# Patient Record
Sex: FEMALE | Race: WHITE | Employment: FULL TIME | URBAN - METROPOLITAN AREA
[De-identification: names, ages, dates, MRNs, and addresses within clinical notes are randomized per-mention and may not be internally consistent; named-entity substitution may affect disease eponyms.]

---

## 2020-11-09 ENCOUNTER — OFFICE VISIT (OUTPATIENT)
Dept: INTERNAL MEDICINE CLINIC | Age: 25
End: 2020-11-09
Payer: OTHER GOVERNMENT

## 2020-11-09 VITALS
SYSTOLIC BLOOD PRESSURE: 109 MMHG | HEIGHT: 63 IN | DIASTOLIC BLOOD PRESSURE: 64 MMHG | RESPIRATION RATE: 18 BRPM | HEART RATE: 81 BPM | TEMPERATURE: 98.3 F | BODY MASS INDEX: 28.42 KG/M2 | WEIGHT: 160.4 LBS | OXYGEN SATURATION: 96 %

## 2020-11-09 DIAGNOSIS — R51.9 CHRONIC INTRACTABLE HEADACHE, UNSPECIFIED HEADACHE TYPE: Primary | ICD-10-CM

## 2020-11-09 DIAGNOSIS — G89.29 CHRONIC INTRACTABLE HEADACHE, UNSPECIFIED HEADACHE TYPE: Primary | ICD-10-CM

## 2020-11-09 DIAGNOSIS — Z23 NEEDS FLU SHOT: ICD-10-CM

## 2020-11-09 DIAGNOSIS — F32.A ANXIETY AND DEPRESSION: ICD-10-CM

## 2020-11-09 DIAGNOSIS — F41.9 ANXIETY AND DEPRESSION: ICD-10-CM

## 2020-11-09 DIAGNOSIS — R11.10 RECURRENT VOMITING: ICD-10-CM

## 2020-11-09 DIAGNOSIS — G47.00 INSOMNIA, UNSPECIFIED TYPE: ICD-10-CM

## 2020-11-09 PROCEDURE — 99204 OFFICE O/P NEW MOD 45 MIN: CPT | Performed by: INTERNAL MEDICINE

## 2020-11-09 PROCEDURE — 90686 IIV4 VACC NO PRSV 0.5 ML IM: CPT | Performed by: INTERNAL MEDICINE

## 2020-11-09 PROCEDURE — 90471 IMMUNIZATION ADMIN: CPT | Performed by: INTERNAL MEDICINE

## 2020-11-09 RX ORDER — VENLAFAXINE HYDROCHLORIDE 37.5 MG/1
37.5 CAPSULE, EXTENDED RELEASE ORAL DAILY
Qty: 30 CAP | Refills: 2 | Status: SHIPPED | OUTPATIENT
Start: 2020-11-09 | End: 2020-12-08 | Stop reason: SDUPTHER

## 2020-11-09 NOTE — PATIENT INSTRUCTIONS
1.    Please follow the following instructions to process/authorize your referral, if needed: 
 
Referrals processing Please verify with your insurance IF you need referral authorization submitted. For insurance plans which require this, please follow the following steps. FAILURE TO DO SO MAY RESULT IN INABILITY TO SEE THE SPECIALIST YOU HAVE BEEN REFERRED TO (once you are scheduled to see them). 1. Call and schedule appointment with specialist 
2. Call our clinic and leave message with provider name, and date of appointment 3. We will then submit the referral to your insurance. This process takes 2-5 business days. If you have questions about scheduling or authorizing referral, you can review with our referral coordinator. You can review with her today if available/if you have time, or you can call to review once you have made your referral/appointment. If you are not sure if you need referral authorizations, please review with the referral coordinator(s), either prior to or after you have made the appointment, as reviewed. 2. In order to clarify which mental health provider you can see, that takes your insurance you can: 
 
--Contact your insurance (you may have a  assigned) to find covered providers. 3.  If you can download your outside records, you can either: 
--bring to us at follow-up to review and scan into your chart 
--attach to a [employer] message and send to us 
--fax to [phone].

## 2020-11-09 NOTE — PROGRESS NOTES
RM#17  Would like the Flu vaccine    Chief Complaint   Patient presents with    New Patient     headache nothing is helping    Insomnia     Ambien was working   [facility] Anxiety     stomach upset, tunnel vison fast HR       1. Have you been to the ER, urgent care clinic since your last visit? Hospitalized since your last visit? [facility] clinic for Covid test results where negative. 2. Have you seen or consulted any other health care providers outside of the [address] [place] [address] since your last visit? Include any pap smears or colon screening. No    Health Maintenance Due   Topic Date Due    HPV Age 9Y-34Y (1 - 2-dose series) 07/31/2006    DTaP/Tdap/Td series (1 - Tdap) 07/31/2016    PAP AKA CERVICAL CYTOLOGY  07/31/2016    Flu Vaccine (1) 09/01/2020       No flowsheet data found. After obtaining consent, and per orders of Dr. Kasia Stewart, injection of Flu vaccine given by Karoline Phalen. Patient instructed to remain in clinic for 20 minutes afterwards, and to report any adverse reaction to me immediately. AVS, education, follow up and recommendations provided and addressed with patient.   services used to advise patient no

## 2020-11-09 NOTE — PROGRESS NOTES
History of Present Illness:   Delonte Lester is a 22 y.o. female here for evaluation:    Chief Complaint   Patient presents with    New Patient     headache nothing is helping    Insomnia     Ambien was working   [facility] Anxiety     stomach upset, tunnel vison fast HR       Notes (nursing/rooming note copied below in italics): Would like the Flu vaccine       Here as new pt to establish care. No records in [address] [place] [address] or [facility]) to review. Prescription Monitoring Program ([place]) database query with fills:  No fills in last 2yrs. She has \"always\" had HA's. Notes worse over past 3-4yrs. She was getting care in [place] when there, and had an injection to help prevent HA's, and this didn't help. She has daily and \"never doesn't have a HA\". She has used amitriptyline in past and didn't help at all. She had ringing in her ears on meds and didn't help at 25mg dose. Has not used beta-blockers for prevention. Has not used topiramate in past either. She will use Aleve for HA PRN pain. She will take 3-4 at time. She is aware recommended dose is 2 tabs/dose BID. She has HA currently, and doesn't usually have relief. Usually worsens during day, but always has somewhat of a HA. She notes sleep has been \"terrible\"  She has problems staying asleep. She will go to sleep around 11PM and up by 2PM.  She sometimes falls back to sleep, but sometimes up all night. She has used [place], but gave her a terrible taste in her mouth. This didn't help with sleep either. She used trazodone in past--early on, and was on 3 tabs and didn't help with sleep also. She had Ambien in past, but required higher doses for effectiveness. Ambien helped most.    She has not used Belsomra. She notes with her anxiety, she has severe regular symptoms. She associates feeling sick and having regular emesis with this regularly.     She notes will throw up in AM and then feel fine rest of day.  She notes not other cause for her vomiting. She has always had very sensitive stomach, but worse past 2-3yrs. She notes will wake up sick, and have nausea at work. She has some food triggers, but not regularly. She associates with anxiety primarily. She will have sweating, tunner vision, rapid heart rates with this also. She will try to exercises to calm herself, but unable to do so. She will wake up with anxiety symptoms in AM as well. She also has depression and above symptoms have limited her activity and her outside activity, which has worsened her depression. She has taken Lexapro in past which didn't help with dose titration. She didn't feel different on this medication. She had used amitriptyline for this then also. This was not significant enough to re-start. Nursing screenings reviewed by provider at visit. Prior to Admission medications    Not on File        ROS  Complete ROS negative except as indicated in note. Vitals:    11/09/20 1445   BP: 109/64   Pulse: 81   Resp: 18   Temp: 98.3 °F (36.8 °C)   TempSrc: Oral   SpO2: 96%   Weight: 160 lb 6.4 oz (72.8 kg)   Height: 5' 3\" (1.6 m)   PainSc:   3   PainLoc: Head      Body mass index is 28.41 kg/m². Physical Exam:     Physical Exam  Vitals signs and nursing note reviewed. Constitutional:       General: She is not in acute distress. Appearance: Normal appearance. She is well-developed. She is not diaphoretic. HENT:      Head: Normocephalic and atraumatic. Eyes:      General: No scleral icterus. Right eye: No discharge. Left eye: No discharge. Conjunctiva/sclera: Conjunctivae normal.   Cardiovascular:      Rate and Rhythm: Normal rate and regular rhythm. Pulses: Normal pulses. Heart sounds: Normal heart sounds. No murmur. No friction rub. No gallop. Pulmonary:      Effort: Pulmonary effort is normal. No respiratory distress.       Breath sounds: Normal breath sounds. No stridor. No wheezing or rhonchi. Abdominal:      General: Bowel sounds are normal.      Palpations: Abdomen is soft. Tenderness: There is no abdominal tenderness. Musculoskeletal:         General: No deformity or signs of injury. Skin:     General: Skin is warm. Coloration: Skin is not jaundiced or pale. Findings: No bruising, erythema or rash. Neurological:      General: No focal deficit present. Mental Status: She is alert. Motor: No abnormal muscle tone. Coordination: Coordination normal.      Gait: Gait normal.   Psychiatric:         Mood and Affect: Mood normal.         Behavior: Behavior normal.         Thought Content: Thought content normal.         Judgment: Judgment normal.         Assessment and Plan:       ICD-10-CM ICD-9-CM    1. Chronic intractable headache, unspecified headache type  R51.9 784.0 REFERRAL TO NEUROLOGY    G89.29     2. Anxiety and depression  F41.9 300.00 venlafaxine-SR (EFFEXOR-XR) 37.5 mg capsule    F32.9 311 REFERRAL TO BEHAVIORAL HEALTH   3. Insomnia, unspecified type  G47.00 780.52 suvorexant (BELSOMRA) 10 mg tablet      REFERRAL TO BEHAVIORAL HEALTH   4. Needs flu shot  Z23 V04.81 INFLUENZA VIRUS VAC QUAD,SPLIT,PRESV FREE SYRINGE IM   5. Recurrent vomiting  R11.10 787.03        1. Referral(s) and referral coordination reviewed with patient at visit. Mgt reviewed with specialist as above. 2.  Medication(s), management and follow-up based on response reviewed at visit. 3.  Medication and coupon to fill reviewed. Clinic coupons , but available on company webpage as reviewed. 2,3:  Referral(s) and referral coordination reviewed with patient at visit. 4.  Immunization(s) reviewed and updated at visit. 5.  Consider GI eval if not improved with HA and MH mgt.       Follow-up and Dispositions    · Return in about 4 weeks (around 2020), or if symptoms worsen or fail to improve, for medication follow-up, referral follow-up.       lab results and schedule of future lab studies reviewed with patient    For additional documentation of information and/or recommendations discussed this visit, please see notes in instructions. Plan and evaluation (above) reviewed with pt at visit  Patient voiced understanding of plan and provided with time to ask/review questions. After Visit Summary (AVS) provided to pt after visit with additional instructions as needed/reviewed.         Future Appointments   Date Time Provider Kishore Fulton   12/8/2020 10:30 AM Jesse Becker MD [facility] AMB   12/14/2020  1:20 PM Pricila Erickson MD [facility] BS AMB

## 2020-11-10 ENCOUNTER — TELEPHONE (OUTPATIENT)
Dept: INTERNAL MEDICINE CLINIC | Age: 25
End: 2020-11-10

## 2020-11-10 NOTE — TELEPHONE ENCOUNTER
Pt called stating that she went to pick-up her medication's yesterday and was informed that she would need an insurnace approval.Writer spoke with pt on 11/10/20 and informed her that we received the PA and that it would be processed. Pt voiced understanding.

## 2020-11-11 NOTE — TELEPHONE ENCOUNTER
Called patient to find out if she was able to printout the coupon for the medication belsomra. Patient stated that she had not gotten around to it but she was goig to stop by the pharmacy after work. This nurse responded that the pharmacy would not be able to fill the prescription without a proir auth from her insurance. She then stated well then contact the insurance company. This nurse started the PA and it was rejected for: The drug you are requesting prior authorization for is not covered. Please select an alternative drug from the choices provided and send in a new prescription for that drug. You may also select \"Complete PA\" to obtain a PA for the originally requested drug. AMBIEN TABS 10MG   ZOLPIDEM TARTRATE TABS 5MG   ZOLPIDEM TARTRATE TABS 10MG   ZALEPLON CAPS 5MG   ZALEPLON CAPS 10MG   ZOLPIDEM TARTRATE ER TABS 6.25MG   ZOLPIDEM TARTRATE ER TABS 12.5MG   ESZOPICLONE TABS 1MG   Complete PA  Dr Titi Jloley please advise.   Thank you

## 2020-12-08 ENCOUNTER — OFFICE VISIT (OUTPATIENT)
Dept: INTERNAL MEDICINE CLINIC | Age: 25
End: 2020-12-08
Payer: OTHER GOVERNMENT

## 2020-12-08 VITALS
OXYGEN SATURATION: 96 % | SYSTOLIC BLOOD PRESSURE: 107 MMHG | DIASTOLIC BLOOD PRESSURE: 69 MMHG | BODY MASS INDEX: 29.44 KG/M2 | TEMPERATURE: 98.1 F | HEART RATE: 70 BPM | HEIGHT: 63 IN | RESPIRATION RATE: 14 BRPM | WEIGHT: 166.13 LBS

## 2020-12-08 DIAGNOSIS — R51.9 CHRONIC INTRACTABLE HEADACHE, UNSPECIFIED HEADACHE TYPE: ICD-10-CM

## 2020-12-08 DIAGNOSIS — G47.00 INSOMNIA, UNSPECIFIED TYPE: ICD-10-CM

## 2020-12-08 DIAGNOSIS — F41.9 ANXIETY AND DEPRESSION: Primary | ICD-10-CM

## 2020-12-08 DIAGNOSIS — F32.A ANXIETY AND DEPRESSION: Primary | ICD-10-CM

## 2020-12-08 DIAGNOSIS — G89.29 CHRONIC INTRACTABLE HEADACHE, UNSPECIFIED HEADACHE TYPE: ICD-10-CM

## 2020-12-08 PROCEDURE — 99214 OFFICE O/P EST MOD 30 MIN: CPT | Performed by: INTERNAL MEDICINE

## 2020-12-08 RX ORDER — VENLAFAXINE HYDROCHLORIDE 75 MG/1
75 CAPSULE, EXTENDED RELEASE ORAL DAILY
Qty: 30 CAP | Refills: 2 | Status: SHIPPED | OUTPATIENT
Start: 2020-12-08 | End: 2021-03-26 | Stop reason: SDUPTHER

## 2020-12-08 NOTE — PATIENT INSTRUCTIONS
If the Belsomra is effective, but need to increase the dose, we can send in, once you have the trial coverage card/voucher from the [facility] website as reviewed. The same voucher will cover trials of each of the 3 Belsomra doses if needed. If the medication is effective at the 10mg dose, we can then work to see if [employer] will cover.

## 2020-12-08 NOTE — PROGRESS NOTES
History of Present Illness:   Prerna Navarro is a 22 y.o. female here for evaluation:    Chief Complaint   Patient presents with    Follow-up     medication review. Patient states that hasn't received the medication for sleep and hasn't heard back from insurance if it has been approved or not. Notes (nursing/rooming note copied below in italics):  As above. Here for follow-up. Seen 11/9 to establish care. Clarified that the Belsomra coupone for trial dosing was available on the website--unable to print coupon for pt at visit due to problem with website printing coupon. Reviewed agreement and process with her at visit. She has neurology appt scheduled as below. She had appt yesterday, but office re-scheduled. She is fine with appt as scheduled. She has had some improvement with her current dose venlafaxine. Feels like she is still \"walking the line\" and could benefit from higher dosing. Reviewed as below. She has established with counselor and this is helping also. Nursing screenings reviewed by provider at visit. Prior to Admission medications    Medication Sig Start Date End Date Taking? Authorizing Provider   venlafaxine-SR [facility]) 37.5 mg capsule Take 1 Cap by mouth daily. 11/9/20  Yes Linda Mendez MD   suvorexant (BELSOMRA) 10 mg tablet Take 1 Tab by mouth nightly as needed for Insomnia. Max Daily Amount: 10 mg. 11/9/20   Linda Mendez MD        ROS    Vitals:    12/08/20 1029   BP: 107/69   Pulse: 70   Resp: 14   Temp: 98.1 °F (36.7 °C)   SpO2: 96%   Weight: 166 lb 2 oz (75.4 kg)   Height: 5' 3\" (1.6 m)   PainSc:   0 - No pain      Body mass index is 29.43 kg/m². Physical Exam:     Physical Exam  Vitals signs and nursing note reviewed. Constitutional:       Appearance: Normal appearance. HENT:      Head: Normocephalic and atraumatic. Mouth/Throat:      Mouth: Mucous membranes are moist.   Eyes:      General: No scleral icterus. Right eye: No discharge. Left eye: No discharge. Cardiovascular:      Rate and Rhythm: Normal rate. Pulses: Normal pulses. Heart sounds: Normal heart sounds. Pulmonary:      Effort: Pulmonary effort is normal. No respiratory distress. Breath sounds: No stridor. No wheezing, rhonchi or rales. Abdominal:      General: Abdomen is flat. Musculoskeletal:         General: No deformity. Skin:     Coloration: Skin is not jaundiced. Findings: No rash. Neurological:      General: No focal deficit present. Mental Status: She is alert. Psychiatric:         Behavior: Behavior normal.         Thought Content: Thought content normal.         Assessment and Plan:       ICD-10-CM ICD-9-CM    1. Anxiety and depression  F41.9 300.00 venlafaxine-SR (EFFEXOR-XR) 75 mg capsule    F32.9 311    2. Chronic intractable headache, unspecified headache type  R51.9 784.0     G89.29     3. Insomnia, unspecified type  G47.00 780.52        1. Medication(s), management and follow-up based on response reviewed at visit. 2.  Eval with neuro scheduled as below. 3.  Reviewed info for on-line voucher and provided to pt at visit. Reviewed on [employer] browser with pt as well during visit. Follow-up and Dispositions    · Return in about 6 weeks (around 1/19/2021) for medication follow-up, referral follow-up.       reviewed medications and side effects in detail    For additional documentation of information and/or recommendations discussed this visit, please see notes in instructions. Plan and evaluation (above) reviewed with pt at visit  Patient voiced understanding of plan and provided with time to ask/review questions. After Visit Summary (AVS) provided to pt after visit with additional instructions as needed/reviewed.         Future Appointments   Date Time Provider Kishore Fulton   1/8/2021 11:00 AM Stacy Connors MD [facility] AMB

## 2020-12-08 NOTE — PROGRESS NOTES
RM:16    Chief Complaint   Patient presents with    Follow-up     medication review. Patient states that hasn't received the medication for sleep and hasn't heard back from insurance if it has been approved or not. Visit Vitals  /69 (BP 1 Location: Left arm, BP Patient Position: Sitting)   Pulse 70   Temp 98.1 °F (36.7 °C)   Resp 14   Ht 5' 3\" (1.6 m)   Wt 166 lb 2 oz (75.4 kg)   SpO2 96%   BMI 29.43 kg/m²     1. Have you been to the ER, urgent care clinic since your last visit? Hospitalized since your last visit? No    2. Have you seen or consulted any other health care providers outside of the [address] [place] [address] since your last visit? Include any pap smears or colon screening.  No

## 2020-12-17 ENCOUNTER — TELEPHONE (OUTPATIENT)
Dept: INTERNAL MEDICINE CLINIC | Age: 25
End: 2020-12-17

## 2020-12-17 NOTE — TELEPHONE ENCOUNTER
----- Message from Joann Nolasco sent at 12/17/2020 11:41 AM EST -----  Regarding: Dr. Vandana Patel Telephone  Referral    Caller (first and last name if not the patient or from practice): n/a      Caller's relationship to patient (if not from a practice): n/a      Name of caller (if calling from a practice): n/a      Name of practice: [facility] [place]      Specialist's title, first, and last name: Rafael Fritz [facility]      Office Phone Number: [phone]      Fax number:[phone]      Date and time of appointment: 12/02/2020       Reason for appointment: Behavioral Health Counseling       Details to clarify the request:  Patient has already started seeing the counselor.  There was a mix up in the original referral request      Consuelo Nolasco

## 2021-01-08 ENCOUNTER — OFFICE VISIT (OUTPATIENT)
Dept: NEUROLOGY | Age: 26
End: 2021-01-08
Payer: OTHER GOVERNMENT

## 2021-01-08 VITALS
OXYGEN SATURATION: 97 % | WEIGHT: 166 LBS | HEIGHT: 63 IN | BODY MASS INDEX: 29.41 KG/M2 | HEART RATE: 71 BPM | SYSTOLIC BLOOD PRESSURE: 100 MMHG | DIASTOLIC BLOOD PRESSURE: 80 MMHG | RESPIRATION RATE: 16 BRPM

## 2021-01-08 DIAGNOSIS — G43.709 CHRONIC MIGRAINE WITHOUT AURA WITHOUT STATUS MIGRAINOSUS, NOT INTRACTABLE: Primary | ICD-10-CM

## 2021-01-08 DIAGNOSIS — T39.95XA ANALGESIC OVERUSE HEADACHE: ICD-10-CM

## 2021-01-08 DIAGNOSIS — G44.40 ANALGESIC OVERUSE HEADACHE: ICD-10-CM

## 2021-01-08 PROCEDURE — 99204 OFFICE O/P NEW MOD 45 MIN: CPT | Performed by: PSYCHIATRY & NEUROLOGY

## 2021-01-08 RX ORDER — PROMETHAZINE HYDROCHLORIDE 12.5 MG/1
12.5 TABLET ORAL
Qty: 30 TAB | Refills: 1 | Status: SHIPPED | OUTPATIENT
Start: 2021-01-08 | End: 2021-06-07

## 2021-01-08 RX ORDER — RIZATRIPTAN BENZOATE 10 MG/1
10 TABLET, ORALLY DISINTEGRATING ORAL
Qty: 9 TAB | Refills: 1 | Status: SHIPPED | OUTPATIENT
Start: 2021-01-08 | End: 2021-01-08

## 2021-01-08 RX ORDER — DEXAMETHASONE 1 MG/1
TABLET ORAL
Qty: 18 TAB | Refills: 1 | Status: SHIPPED | OUTPATIENT
Start: 2021-01-08 | End: 2021-03-26

## 2021-01-08 NOTE — PROGRESS NOTES
Chief Complaint   Patient presents with    Headache         HISTORY OF PRESENT ILLNESS  Kian Aranda is a 22 y.o. female elevation regarding migraines. She started having migraines around age 15 but over the last couple of years they have become more frequent. She describes the headaches as typically starting with a feeling of tunnel vision, and that she will start to feel pressure in the back of her head and it will eventually change into sharp throbbing pain associated with light and noise sensitivity. Sometimes she will feel nauseous and has vomiting. No other focal motor or sensory deficits. She cannot think of any triggers. She takes naproxen/Aleve which does not always help. Has not used triptans ever. Was tried on amitriptyline at bedtime for prophylaxis which gave her ringing sounds in her ears and did not help with migraines. She keeps a mild headache almost on a daily basis and has been using Aleve almost daily for the past several weeks. Mother gets headaches. Currently working as a  at a [address] [place] [address]. Past Medical History:   Diagnosis Date    Headache      Current Outpatient Medications   Medication Sig    rizatriptan (MAXALT-MLT) 10 mg disintegrating tablet Take 1 Tab by mouth once as needed for Migraine for up to 1 dose.  promethazine (PHENERGAN) 12.5 mg tablet Take 1 Tab by mouth every six (6) hours as needed (Migraine).  dexAMETHasone (DECADRON) 1 mg tablet TAKE 1 TABLET THREE TIMES A DAY FOR 3 DAYS, THEN 1 TABLET TWO TIMES A DAY FOR 3 DAYS, THEN 1 TABLET ONCE A DAY FOR 3 DAYS AND THEN STOP    venlafaxine-SR (EFFEXOR-XR) 75 mg capsule Take 1 Cap by mouth daily. Increase from 37.5mg daily dose.  suvorexant (BELSOMRA) 10 mg tablet Take 1 Tab by mouth nightly as needed for Insomnia. Max Daily Amount: 10 mg. No current facility-administered medications for this visit. Allergies   Allergen Reactions    Amitriptyline Other (comments)     Tinnitus. And not effective for sleep or headache mgt.  Lunesta [Eszopiclone] Other (comments)     Bad taste and not effective for sleep mgt. History reviewed. No pertinent family history. 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         REVIEW OF SYSTEMS  Review of Systems - History obtained from the patient  Psychological ROS: negative  ENT ROS: negative  Hematological and Lymphatic ROS: negative  Endocrine ROS: negative  Respiratory ROS: no cough, shortness of breath, or wheezing  Cardiovascular ROS: no chest pain or dyspnea on exertion  Gastrointestinal ROS: no abdominal pain, change in bowel habits, or black or bloody stools  Genito-Urinary ROS: no dysuria, trouble voiding, or hematuria  Musculoskeletal ROS: negative  Dermatological ROS: negative      PHYSICAL EXAMINATION:    Visit Vitals  /80   Pulse 71   Resp 16   Ht 5' 3\" (1.6 m)   Wt 166 lb (75.3 kg)   SpO2 97%   BMI 29.41 kg/m²     General:  Well nourished and groomed individual in no acute distress. Neck: Supple, nontender, no bruits, no pain with resistance to active range of motion. Heart: Regular rate and rhythm. Normal S1S2. Lungs:  Equal chest expansion, no cough, no wheeze  Musculoskeletal:  Extremities revealed no edema and had full range of motion of joints. Psych:  Good mood and bright affect    NEUROLOGICAL EXAMINATION:     Mental Status:   Alert and oriented to person, place, and time with recent and remote memory intact. Attention span and concentration are normal. Speech is fluent. Cranial Nerves:    II, III, IV, VI:  Visual acuity grossly intact.  Visual fields are normal.    Pupils are equal, round, and reactive to light and accommodation. Extra-ocular movements are full and fluid. Fundoscopic exam was benign, no ptosis or nystagmus. V-XII: Hearing is grossly intact. Facial features are symmetric, with normal sensation and strength. The palate rises symmetrically and the tongue protrudes midline. Sternocleidomastoids 5/5. Motor Examination: Normal tone, bulk, and strength. 5/5 muscle strength throughout. No cogwheel rigidity or clonus present. Sensory exam:  Normal throughout to pinprick, temperature, and vibration sense. Normal proprioception. Coordination:  Finger to nose and rapid arm movement testing was normal.   No resting or intention tremor    Gait and Station:  Steady while walking on toes, heels, and with tandem walking. Normal arm swing. No Rhomberg or pronator drift. No muscle wasting or fasiculations noted. Reflexes:  DTRs 2+ throughout. Toes downgoing. ASSESSMENT    ICD-10-CM ICD-9-CM    1. Chronic migraine without aura without status migrainosus, not intractable  G43.709 346.70 rizatriptan (MAXALT-MLT) 10 mg disintegrating tablet      promethazine (PHENERGAN) 12.5 mg tablet      dexAMETHasone (DECADRON) 1 mg tablet   2. Analgesic overuse headache  G44.40 339.3 rizatriptan (MAXALT-MLT) 10 mg disintegrating tablet    T39.95XA E935.9 promethazine (PHENERGAN) 12.5 mg tablet      dexAMETHasone (DECADRON) 1 mg tablet       DISCUSSION  Ms. Mague Rivera  has common migraines occasionally associated with aura which is described as a feeling of tunnel vision  The treatment strategies for migraines were reviewed at length including potential dietary and environmental triggers. she was advised to keep a headache log so as to identify and avoid these triggers. May use rizatriptan with or without an NSAID for abortive therapy at the onset of headache. Dose can be repeated in 2 hours if needed.  Avoid using them for more than 2 days per week to prevent analgesic overuse and rebound headache  Nutritional supplements for migraine prevention were discussed  We discussed analgesic induced HA and the use of analgesics more than 2-3 times weekly will do nothing but drive the HA and to break this cycle all analgesics, whether OTC or prescribed have to be discontinued and HA likely to get worse before they get better. A short course of steroid was also given. Follow-up in 3 months or earlier    Thank you for allowing me to participate in the care of Ms. Sheehan. Please feel free to contact me if you have any questions. I will be happy to follow to follow her along with you.     Mateus Montilla MD  Diplomate, American Board of Psychiatry & Neurology (Neurology)  [facility] Board of Psychiatry & Neurology (Clinical Neurophysiology)  Diplomate, American Board of Electrodiagnostic Medicine

## 2021-01-08 NOTE — PATIENT INSTRUCTIONS
PRESCRIPTION REFILL POLICY [address] [place] [address] Statement to Patients April 1, 2014 In an effort to ensure the large volume of patient prescription refills is processed in the most efficient and expeditious manner, we are asking our patients to assist us by calling your Pharmacy for all prescription refills, this will include also your  Mail Order Pharmacy. The pharmacy will contact our office electronically to continue the refill process. Please do not wait until the last minute to call your pharmacy. We need at least 48 hours (2days) to fill prescriptions. We also encourage you to call your pharmacy before going to  your prescription to make sure it is ready. With regard to controlled substance prescription refill requests (narcotic refills) that need to be picked up at our office, we ask your cooperation by providing us with at least 72 hours (3days) notice that you will need a refill. We will not refill narcotic prescription refill requests after 4:00pm on any weekday, Monday through Thursday, or after 2:00pm on Fridays, or on the weekends. We encourage everyone to explore another way of getting your prescription refill request processed using [employer] our patient web portal through our electronic medical record system. [employer] is an efficient and effective way to communicate your medication request directly to the office and  downloadable as an alison on your smart phone . [employer] also features a review functionality that allows you to view your medication list as well as leave messages for your physician. Are you ready to get connected? If so please review the attatched instructions or speak to any of our staff to get you set up right away! Thank you so much for your cooperation. Should you have any questions please contact our Practice Administrator. The Physicians and Staff,  [address] [place] [address]

## 2021-01-08 NOTE — PROGRESS NOTES
Ms. Alfred Bennett presents as a new patient for evaluation of daily headaches. Depression screening done on patient.

## 2021-03-18 DIAGNOSIS — F32.A ANXIETY AND DEPRESSION: ICD-10-CM

## 2021-03-18 DIAGNOSIS — F41.9 ANXIETY AND DEPRESSION: ICD-10-CM

## 2021-03-18 RX ORDER — VENLAFAXINE HYDROCHLORIDE 75 MG/1
75 CAPSULE, EXTENDED RELEASE ORAL DAILY
Qty: 90 CAP | Refills: 0 | Status: CANCELLED | OUTPATIENT
Start: 2021-03-18

## 2021-03-18 NOTE — TELEPHONE ENCOUNTER
Last visit 12/08/2020 MD Fred Szymanski   Next appointment 6 weeks (01/2021) - Nothing scheduled   Previous refill encounter(s)   12/08/2020 Effexor-XR #30 with 2 refills     Requested Prescriptions     Pending Prescriptions Disp Refills    venlafaxine-SR (EFFEXOR-XR) 75 mg capsule 90 Cap 0     Sig: Take 1 Cap by mouth daily. Increase from 37.5mg daily dose.

## 2021-03-26 ENCOUNTER — OFFICE VISIT (OUTPATIENT)
Dept: INTERNAL MEDICINE CLINIC | Age: 26
End: 2021-03-26
Payer: OTHER GOVERNMENT

## 2021-03-26 VITALS
WEIGHT: 161.13 LBS | HEIGHT: 63 IN | HEART RATE: 71 BPM | TEMPERATURE: 97.3 F | SYSTOLIC BLOOD PRESSURE: 102 MMHG | OXYGEN SATURATION: 96 % | BODY MASS INDEX: 28.55 KG/M2 | DIASTOLIC BLOOD PRESSURE: 61 MMHG | RESPIRATION RATE: 16 BRPM

## 2021-03-26 DIAGNOSIS — L73.9 FOLLICULITIS: ICD-10-CM

## 2021-03-26 DIAGNOSIS — R51.9 CHRONIC INTRACTABLE HEADACHE, UNSPECIFIED HEADACHE TYPE: ICD-10-CM

## 2021-03-26 DIAGNOSIS — F32.A ANXIETY AND DEPRESSION: Primary | ICD-10-CM

## 2021-03-26 DIAGNOSIS — G47.00 INSOMNIA, UNSPECIFIED TYPE: ICD-10-CM

## 2021-03-26 DIAGNOSIS — G89.29 CHRONIC INTRACTABLE HEADACHE, UNSPECIFIED HEADACHE TYPE: ICD-10-CM

## 2021-03-26 DIAGNOSIS — F41.9 ANXIETY AND DEPRESSION: Primary | ICD-10-CM

## 2021-03-26 PROCEDURE — 99214 OFFICE O/P EST MOD 30 MIN: CPT | Performed by: INTERNAL MEDICINE

## 2021-03-26 RX ORDER — ZOLPIDEM TARTRATE 5 MG/1
5-10 TABLET ORAL
Qty: 45 TAB | Refills: 2 | Status: SHIPPED | OUTPATIENT
Start: 2021-03-26 | End: 2021-09-07

## 2021-03-26 RX ORDER — VENLAFAXINE HYDROCHLORIDE 75 MG/1
150 CAPSULE, EXTENDED RELEASE ORAL DAILY
Qty: 60 CAP | Refills: 2 | Status: SHIPPED | OUTPATIENT
Start: 2021-03-26 | End: 2021-07-06 | Stop reason: SDUPTHER

## 2021-03-26 NOTE — PATIENT INSTRUCTIONS
1.  After 4-6 weeks of the 150mg daily venlafaxine (Effexor) dose, we can continue as a single 150mg tablet if doing better at this dose. If you don't notice any improvement, you can resume the 75mg daily dose instead. 2.  Please schedule follow-up with Dr. Thiago Bartlett to follow-up on your headache management.

## 2021-03-26 NOTE — PROGRESS NOTES
RM 16    Chief Complaint   Patient presents with    Medication Evaluation     Patient is fasting.  Skin Problem     bump on groin, starting to improving. Slight pain reported. 1. Have you been to the ER, urgent care clinic since your last visit? Hospitalized since your last visit? No    2. Have you seen or consulted any other health care providers outside of the [address] [place] [address] since your last visit? Include any pap smears or colon screening. No    Health Maintenance Due   Topic Date Due    Hepatitis C Screening  Never done    HPV Age 9Y-34Y (1 - 2-dose series) Never done    DTaP/Tdap/Td series (1 - Tdap) Never done    PAP AKA CERVICAL CYTOLOGY  Never done       Abuse Screening Questionnaire 3/26/2021   Do you ever feel afraid of your partner? N   Are you in a relationship with someone who physically or mentally threatens you? N   Is it safe for you to go home?  Y       3 most recent PHQ Screens 3/26/2021   Little interest or pleasure in doing things Several days   Feeling down, depressed, irritable, or hopeless Several days   Total Score PHQ 2 2   Trouble falling or staying asleep, or sleeping too much -   Feeling tired or having little energy -   Poor appetite, weight loss, or overeating -   Feeling bad about yourself - or that you are a failure or have let yourself or your family down -   Trouble concentrating on things such as school, work, reading, or watching TV -   Moving or speaking so slowly that other people could have noticed; or the opposite being so fidgety that others notice -   Thoughts of being better off dead, or hurting yourself in some way -   PHQ 9 Score -   How difficult have these problems made it for you to do your work, take care of your home and get along with others -       Learning Assessment 1/8/2021   PRIMARY LEARNER Patient   PRIMARY LANGUAGE ENGLISH   LEARNER PREFERENCE PRIMARY LISTENING   ANSWERED BY patient   RELATIONSHIP SELF

## 2021-03-26 NOTE — PROGRESS NOTES
Brianna Isabel (: 1995) is a 22 y.o. female, established patient, here for evaluation of the following chief complaint(s):  Chief Complaint   Patient presents with    Medication Evaluation     Patient is fasting.  Skin Problem     bump on groin, starting to improving. Slight pain reported. Assessment and Plan:       ICD-10-CM ICD-9-CM    1. Anxiety and depression  F41.9 300.00 venlafaxine-SR (EFFEXOR-XR) 75 mg capsule    F32.9 311    2. Insomnia, unspecified type  G47.00 780.52 zolpidem (AMBIEN) 5 mg tablet   3. Folliculitis--right groin--resolving  L73.9 704.8    4. Chronic intractable headache, unspecified headache type  R51.9 784.0     G89.29         1. Increased dose medication reviewed as trial to see if more effective. Plan eval with MH provider if not effective at follow-up (at 150mg daily dose). 2.  Has been somewhat effective with Ambien mgt in past.  Reviewed dosing and follow-up to evaluate dosing effectiveness. 3.  Reviewed mgt/follow-up if not resolving/continuing to improve. 4.  Follow-up with neurology reviewed. Follow-up and Dispositions    · Return in about 2 months (around 2021), or if symptoms worsen or fail to improve, for medication follow-up.       reviewed medications and side effects in detail    For additional documentation of information and/or recommendations discussed this visit, please see notes in instructions. Plan and evaluation (above) reviewed with pt at visit  Patient voiced understanding of plan and provided with time to ask/review questions. After Visit Summary (AVS) provided to pt after visit with additional instructions as needed/reviewed. No future appointments. --Updated future visits after patient check-out. History of Present Illness:     Notes (nursing/rooming note copied below in italics):  As above    Here for medication follow-up      Migraine HA:  Seen by Neuro for HA mgt 2021.   She notes with mgt there nothing helped WAGGONER get better. She has been able to limit NSAIDs more than prior. Follow-up appt scheduling with neuro reviewed. Insomnia:  Prescription Monitoring Program ([place]) database query with fills:  Fill Date ID   Written Drug Qty Days Prescriber Rx # Pharmacy Refill   Daily Dose* Pymt Type      12/11/2020  1   11/09/2020  Belsomra 10 MG Tablet  10.00  [facility]   [id number]   [facility][id number][facility]   0  0.20 [facility]  [facility]   [facility]     She notes with medication above, didn't help with sleep. She had used 20mg nightly. She has used amitriptyline, trazodone, Ambien. She had best effects with Ambien. She has done [facility] and felt like had weird/persistent awful taste in her mouth next day. She had done Ambien 5mg nightly and effective at 5-10mg dose. Anxiety/depression:   She felt like she was doing well for a while on medication venlafaxine. She is seeing therapist weekly. They have not really discussed mediation response. Has not scheduled with [place] [address] provider at this time. Reviewed dose mgt as above. Discussed if incrase not effective, can resume current 75mg dose. Prescribed as 2 x 75mg daily to allow dose titration down to 75mg daily dose if needed. She notes groin rash was a small pustule in right groin. Not related to other rash or shaving. She notes resolving since she scheduled appt here. Not needing eval/exam today. She will follow-up as needed. No concern noted for mgt with PO antibiotic at this time. Reviewed topical OTC triple antibiotic, or script for Mupirocin. Pt not interested in printed script for mgt at this time. Nursing screenings reviewed by provider at visit. Prior to Admission medications    Medication Sig Start Date End Date Taking? Authorizing Provider   venlafaxine-SR [facility] 75 mg capsule Take 1 Cap by mouth daily. Increase from 37.5mg daily dose.  12/8/20  Yes Joshua Jerez MD   promethazine (PHENERGAN) 12.5 mg tablet Take 1 Tab by mouth every six (6) hours as needed (Migraine). 1/8/21   Prashant Connors MD   dexAMETHasone (DECADRON) 1 mg tablet TAKE 1 TABLET THREE TIMES A DAY FOR 3 DAYS, THEN 1 TABLET TWO TIMES A DAY FOR 3 DAYS, THEN 1 TABLET ONCE A DAY FOR 3 DAYS AND THEN STOP 1/8/21   Dori Bennett MD   suvorexant (BELSOMRA) 10 mg tablet Take 1 Tab by mouth nightly as needed for Insomnia. Max Daily Amount: 10 mg. 11/9/20   Dorothy Grey MD        ROS    Vitals:    03/26/21 0845   BP: 102/61   Pulse: 71   Resp: 16   Temp: 97.3 °F (36.3 °C)   TempSrc: Temporal   SpO2: 96%   Weight: 161 lb 2 oz (73.1 kg)   Height: 5' 3\" (1.6 m)   PainSc:   0 - No pain   LMP: 03/08/2021      Body mass index is 28.54 kg/m². Physical Exam:     Physical Exam  Vitals signs and nursing note reviewed. Constitutional:       General: She is not in acute distress. Appearance: Normal appearance. She is well-developed. She is not diaphoretic. HENT:      Head: Normocephalic and atraumatic. Eyes:      General: No scleral icterus. Right eye: No discharge. Left eye: No discharge. Conjunctiva/sclera: Conjunctivae normal.   Cardiovascular:      Rate and Rhythm: Normal rate and regular rhythm. Pulses: Normal pulses. Heart sounds: Normal heart sounds. No murmur. No friction rub. No gallop. Pulmonary:      Effort: Pulmonary effort is normal. No respiratory distress. Breath sounds: Normal breath sounds. No stridor. No wheezing, rhonchi or rales. Abdominal:      General: Bowel sounds are normal. There is no distension. Palpations: Abdomen is soft. Tenderness: There is no abdominal tenderness. Musculoskeletal:         General: No swelling, tenderness, deformity or signs of injury. Right lower leg: No edema. Left lower leg: No edema. Skin:     General: Skin is warm. Coloration: Skin is not jaundiced or pale. Findings: No bruising, erythema or rash.    Neurological:      General: No focal deficit present. Mental Status: She is alert. Motor: No abnormal muscle tone. Coordination: Coordination normal.      Gait: Gait normal.   Psychiatric:         Mood and Affect: Mood normal.         Behavior: Behavior normal.         Thought Content: Thought content normal.         Judgment: Judgment normal.         An electronic signature was used to authenticate this note.   -- Bianca Tee MD

## 2021-06-07 ENCOUNTER — OFFICE VISIT (OUTPATIENT)
Dept: URGENT CARE | Age: 26
End: 2021-06-07
Payer: OTHER GOVERNMENT

## 2021-06-07 ENCOUNTER — VIRTUAL VISIT (OUTPATIENT)
Dept: INTERNAL MEDICINE CLINIC | Age: 26
End: 2021-06-07

## 2021-06-07 VITALS — HEART RATE: 94 BPM | OXYGEN SATURATION: 94 % | TEMPERATURE: 98.3 F | RESPIRATION RATE: 20 BRPM

## 2021-06-07 DIAGNOSIS — J06.9 VIRAL URI: Primary | ICD-10-CM

## 2021-06-07 DIAGNOSIS — J40 WHEEZY BRONCHITIS: Primary | ICD-10-CM

## 2021-06-07 DIAGNOSIS — R05.8 PRODUCTIVE COUGH: ICD-10-CM

## 2021-06-07 DIAGNOSIS — R06.02 SHORTNESS OF BREATH: ICD-10-CM

## 2021-06-07 DIAGNOSIS — R06.02 MILD SHORTNESS OF BREATH: ICD-10-CM

## 2021-06-07 LAB — SARS-COV-2 POC: NEGATIVE

## 2021-06-07 PROCEDURE — 87426 SARSCOV CORONAVIRUS AG IA: CPT | Performed by: NURSE PRACTITIONER

## 2021-06-07 PROCEDURE — 99203 OFFICE O/P NEW LOW 30 MIN: CPT | Performed by: NURSE PRACTITIONER

## 2021-06-07 PROCEDURE — 99212 OFFICE O/P EST SF 10 MIN: CPT | Performed by: INTERNAL MEDICINE

## 2021-06-07 RX ORDER — ALBUTEROL SULFATE 90 UG/1
2 AEROSOL, METERED RESPIRATORY (INHALATION)
Qty: 1 INHALER | Refills: 0 | Status: SHIPPED | OUTPATIENT
Start: 2021-06-07

## 2021-06-07 RX ORDER — PREDNISONE 20 MG/1
TABLET ORAL
Qty: 8 TABLET | Refills: 0 | Status: SHIPPED | OUTPATIENT
Start: 2021-06-07 | End: 2021-09-07

## 2021-06-07 RX ORDER — AZITHROMYCIN 250 MG/1
TABLET, FILM COATED ORAL
Qty: 6 TABLET | Refills: 0 | Status: SHIPPED | OUTPATIENT
Start: 2021-06-07 | End: 2021-09-07

## 2021-06-07 NOTE — PROGRESS NOTES
Oj Osuna is a 22 y.o. female who was seen by synchronous (real-time) audio-video technology on 6/7/2021. Consent: Oj Osuna, who was seen by synchronous (real-time) audio-video technology, and/or her healthcare decision maker, is aware that this patient-initiated, Telehealth encounter on 6/7/2021 is a billable service, with coverage as determined by her insurance carrier. She is aware that she may receive a bill and has provided verbal consent to proceed: Yes. Assessment & Plan:   Diagnoses and all orders for this visit:    1. Viral URI    2. Productive cough    3. Shortness of breath      Viral URI - care so far only supportive. Has not completed testing for covid   - discussed need to quarantine until covid testing negative. - discussed ongoing supportive care. - further evlauation with vitals and chest exam indicated. - patient plans to seek this at urgent care this evening. Follow-up and Dispositions    · Return if symptoms worsen or fail to improve. (Please also see details in patient instructions)  Subjective:   Oj Osuna is a 22 y.o. female who was seen for Cough (started last week with cold symptoms. now with coughing that causes SOB. )    Reports cold started last Tuesday 6/1/2021 - congestion, sneezing, head pressure. Was feeling better. - continues to have improved energy and decreased congestion. However cough increasing since 6/4/2021 with production     No fever. Short of breath feeling started around Friday. Taking nyquil and dayquil. No history of asthma. No tobacco nor other smoke exposure. Works as    + productive cough. No blood in sputum. Mild pressure and congestion. Energy feels better but now cough is taking her down. Prior to Admission medications    Medication Sig Start Date End Date Taking? Authorizing Provider   venlafaxine-SR [facility] 75 mg capsule Take 2 Caps by mouth daily.  Increase from 75mg daily dose. 3/26/21  Yes Jeremías Scherer MD   zolpidem (AMBIEN) 5 mg tablet Take 1-2 Tabs by mouth nightly as needed for Sleep. Max Daily Amount: 10 mg. 3/26/21  Yes Jeremías Scherer MD   promethazine (PHENERGAN) 12.5 mg tablet Take 1 Tab by mouth every six (6) hours as needed (Migraine). Patient not taking: Reported on 3/26/2021 1/8/21   Salud Rico MD     Allergies   Allergen Reactions    Amitriptyline Other (comments)     Tinnitus. And not effective for sleep or headache mgt.  Lunesta [Eszopiclone] Other (comments)     Bad taste and not effective for sleep mgt. Social History     Tobacco Use    Smoking status: Never Smoker    Smokeless tobacco: Never Used   Substance Use Topics    Alcohol use: Yes     Comment: occasional     Review of Systems   Constitutional: Negative for chills, fever and malaise/fatigue. HENT: Positive for congestion. Respiratory: Positive for cough and shortness of breath. Cardiovascular: Negative for chest pain and palpitations. Objective:   Vital Signs: (As obtained by patient/caregiver at home)  There were no vitals taken for this visit. PHYSICAL EXAMINATION:    Constitutional: [x] Appears well-developed and well-nourished [x] No apparent distress      Mental status: [x] Alert and awake  [x] Oriented to person/place/time [x] Able to follow commands      Eyes:   EOM    [x]  Normal      Sclera  [x]  Normal              Discharge [x]  None visible       HENT: [x] Normocephalic, atraumatic    [x] Mouth/Throat: Mucous membranes are moist    External Ears [x] Normal      Neck: [x] No visualized mass     Pulmonary/Chest: [x] Respiratory effort normal   [x] No visualized signs of difficulty breathing or respiratory distress   - fluent speech. No signs of respiratory distress.                Neurological:        [x] No Facial Asymmetry (Cranial nerve 7 motor function) (limited exam due to video visit)          [x] No gaze palsy              Skin: [x] No significant exanthematous lesions or discoloration noted on facial skin              Psychiatric:       [x] Normal Affect        [x] Normal behavior    Other pertinent observable physical exam findings:- none    We discussed the expected course, resolution and complications of the diagnosis(es) in detail. Medication risks, benefits, costs, interactions, and alternatives were discussed as indicated. I advised her to contact the office if her condition worsens, changes or fails to improve as anticipated. She expressed understanding with the diagnosis(es) and plan. Vita Roa is a 22 y.o. female who was evaluated by a video visit encounter for concerns as above. Patient identification was verified prior to start of the visit. A caregiver was present when appropriate. Due to this being a TeleHealth encounter (During DKKAV-53 public health emergency), evaluation of the following organ systems was limited: Vitals/Constitutional/EENT/Resp/CV/GI//MS/Neuro/Skin/Heme-Lymph-Imm. Pursuant to the emergency declaration under the [facility], 1135 waiver authority and the [employer] Act, this Virtual  Visit was conducted, with patient's (and/or legal guardian's) consent, to reduce the patient's risk of exposure to COVID-19 and provide necessary medical care. Services were provided through a video synchronous discussion virtually to substitute for in-person clinic visit. Patient was located at their home. Provider was in office.        Napoleon Doyle MD

## 2021-06-07 NOTE — PATIENT INSTRUCTIONS
COVID-19 Viral Test: About This Test 
What is it? A COVID-19 viral test is a way to find out if you have COVID-19. The test looks for the virus in your breathing passages. There are different types of viral tests. One type looks for genetic material from the virus. This is usually called polymerase chain reaction (PCR). Another type looks for proteins on the virus. This is usually called an antigen test. It may not be as accurate as PCR. Some test results come back in a few minutes. Others may take a few days. Why is it done? This test is used to diagnose a current infection with SARS-CoV-2, the virus that causes COVID-19. Knowing that you have the virus means that you can take steps to protect others from getting infected. This can help limit the spread of the virus. Knowing who has COVID-19 is also important for experts who track the virus. It can help them learn more about how the virus spreads. How do you prepare for the test? 
You don't need to do anything to prepare for this test. But be sure to follow any instructions your health care provider gives you. How is it done? The test is most often done on a sample from your nose or throat. It's sometimes done on a sample of saliva. One way a sample is collected is by putting a long swab into the back of your nose. Samples can be tested in different ways to look for an infection. What should you do while you wait for your test results? While you wait for the results of your COVID-19 test, stay in the place where you live, and stay away from others. Do this even if you don't feel sick or have any symptoms. Don't leave unless you need medical care. If you can, try to stay in a separate room. This might help you avoid infecting family members or other people you live with. Follow your doctor's instructions about what to do when you get your results back.  
Be sure to wear a mask and follow social-distancing guidelines after you get your results, even if the test is negative. What do your results mean? The result is either positive or negative. A positive result means that the antigen or the genetic material of the virus was found in your sample. You have COVID-19 now. A negative result means that the antigen or the genetic material was not found. This may mean that you don't have COVID-19. But it's possible to get a \"false-negative\" result. This means that the test shows that you don't have COVID-19 when in fact you do. This may happen because you were tested too soon after you were infected, before the virus started to spread in your nose and throat. Or it could happen because the swab missed the infection. If you get a negative result for an antigen test, your doctor may recommend that you get another test, such as polymerase chain reaction (PCR), to make sure you don't have the virus. In general, PCR is more accurate than an antigen test. 
Some test results come back in a few minutes. Others may take a few days. If your test is negative, follow your doctor's advice for when you can go back to activities. If your test is positive, talk to your doctor or a public health official about what you need to do. Where can you learn more? Go to http://www.gray.com/ Enter A129 in the search box to learn more about \"COVID-19 Viral Test: About This Test.\" Current as of: December 18, 2020               Content Version: 12.8 © 9791-7144 [employer]. Care instructions adapted under license by [employer] (which disclaims liability or warranty for this information). If you have questions about a medical condition or this instruction, always ask your healthcare professional. [place] [address] any warranty or liability for your use of this information. Coronavirus ([id number]-65): Care Instructions Overview The coronavirus disease (COVID-19) is caused by a virus.  Symptoms may include a fever, a cough, and shortness of breath. It mainly spreads person-to-person through droplets from coughing and sneezing. The virus also can spread when people are in close contact with someone who is infected. Most people have mild symptoms and can take care of themselves at home. If their symptoms get worse, they may need care in a hospital. Treatment may include medicines to reduce symptoms, plus breathing support such as oxygen therapy or a ventilator. It's important to not spread the virus to others. If you have COVID-19, wear a face cover anytime you are around other people. You need to isolate yourself while you are sick. Leave your home only if you need to get medical care or testing. Follow-up care is a key part of your treatment and safety. Be sure to make and go to all appointments, and call your doctor if you are having problems. It's also a good idea to know your test results and keep a list of the medicines you take. How can you care for yourself at home? · Get extra rest. It can help you feel better. · Drink plenty of fluids. This helps replace fluids lost from fever. Fluids also help ease a scratchy throat. Water, soup, fruit juice, and hot tea with lemon are good choices. · Take acetaminophen (such as Tylenol) to reduce a fever. It may also help with muscle aches. Read and follow all instructions on the label. · Use petroleum jelly on sore skin. This can help if the skin around your nose and lips becomes sore from rubbing a lot with tissues. Tips for self-isolation · Limit contact with people in your home. If possible, stay in a separate bedroom and use a separate bathroom. · Wear a cloth face cover when you are around other people. It can help stop the spread of the virus when you cough or sneeze. · If you have to leave home, avoid crowds and try to stay at least 6 feet away from other people. · Avoid contact with pets and other animals.  
· Cover your mouth and nose with a tissue when you cough or sneeze. Then throw it in the trash right away. · Wash your hands often, especially after you cough or sneeze. Use soap and water, and scrub for at least 20 seconds. If soap and water aren't available, use an alcohol-based hand . · Don't share personal household items. These include bedding, towels, cups and glasses, and eating utensils. · [address] in the warmest water allowed for the fabric type, and dry it completely. It's okay to wash other people's laundry with yours. · Clean and disinfect your home every day. Use household  and disinfectant wipes or sprays. Take special care to clean things that you grab with your hands. These include doorknobs, remote controls, phones, and handles on your refrigerator and microwave. And don't forget countertops, tabletops, bathrooms, and computer keyboards. When you can end self-isolation · If you know or suspect that you have COVID-19, stay in self-isolation until: 
? You haven't had a fever for 24 hours while not taking medicines to lower the fever, and 
? Your symptoms have improved, and 
? It's been at least 10 days since your symptoms started. · Talk to your doctor about whether you also need testing, especially if you have a weakened immune system. When should you call for help? Call 911 anytime you think you may need emergency care. For example, call if you have life-threatening symptoms, such as: 
  · You have severe trouble breathing. (You can't talk at all.)  
  · You have constant chest pain or pressure.  
  · You are severely dizzy or lightheaded.  
  · You are confused or can't think clearly.  
  · Your face and lips have a blue color.  
  · You pass out (lose consciousness) or are very hard to wake up. Call your doctor now or seek immediate medical care if: 
  · You have moderate trouble breathing.  (You can't speak a full sentence.)  
  · You are coughing up blood (more than about 1 teaspoon).  
  · You have signs of low blood pressure. These include feeling lightheaded; being too weak to stand; and having cold, pale, clammy skin. Watch closely for changes in your health, and be sure to contact your doctor if: 
  · Your symptoms get worse.  
  · You are not getting better as expected. Call before you go to the doctor's office. Follow their instructions. And wear a cloth face cover. Current as of: December 18, 2020               Content Version: 12.8 © 2006-2021 [employer]. Care instructions adapted under license by [employer] (which disclaims liability or warranty for this information). If you have questions about a medical condition or this instruction, always ask your healthcare professional. [place] [address] any warranty or liability for your use of this information.

## 2021-06-07 NOTE — PROGRESS NOTES
VV    Chief Complaint   Patient presents with    Cough     started last week with cold symptoms. now with coughing that causes SOB. There were no vitals taken for this visit. 3 most recent PHQ Screens 3/26/2021   Little interest or pleasure in doing things Several days   Feeling down, depressed, irritable, or hopeless Several days   Total Score PHQ 2 2   Trouble falling or staying asleep, or sleeping too much -   Feeling tired or having little energy -   Poor appetite, weight loss, or overeating -   Feeling bad about yourself - or that you are a failure or have let yourself or your family down -   Trouble concentrating on things such as school, work, reading, or watching TV -   Moving or speaking so slowly that other people could have noticed; or the opposite being so fidgety that others notice -   Thoughts of being better off dead, or hurting yourself in some way -   PHQ 9 Score -   How difficult have these problems made it for you to do your work, take care of your home and get along with others -         1. Have you been to the ER, urgent care clinic since your last visit? Hospitalized since your last visit? No    2. Have you seen or consulted any other health care providers outside of the [address] [place] [address] since your last visit? Include any pap smears or colon screening. No    Health Maintenance Due   Topic Date Due    Hepatitis C Screening  Never done    HPV Age 9Y-34Y (1 - 2-dose series) Never done    COVID-19 Vaccine (1) Never done    DTaP/Tdap/Td series (1 - Tdap) Never done    PAP AKA CERVICAL CYTOLOGY  Never done       Learning Assessment 1/8/2021   PRIMARY LEARNER Patient   PRIMARY LANGUAGE ENGLISH   LEARNER PREFERENCE PRIMARY LISTENING   ANSWERED BY patient   RELATIONSHIP SELF       AVS  education, follow up, and recommendations provided and addressed with patient.   services used to advise patient no

## 2021-06-07 NOTE — PROGRESS NOTES
Subjective: (As above and below)       This patient was seen in Flu Clinic at [address] [place] [address] Urgent Care while outdoors at their vehicle due to COVID-19 pandemic with PPE and focused examination in order to decrease community viral transmission. The patient/guardian gave verbal consent to treat. Chief Complaint   Patient presents with   Yvonna [place] Symptoms     Lyudmila Laws is a 22 y.o. female who presents for evaluation of: productive cough and wheezing. Symptom onset this past week seemed like allergies or cold with nasal congestion, runny nose, scratchy throat and cough that got better then worsened. Cough is productive of non bloody sputum with associated wheezing and mild SOB but denies pleuritic pain . Preceding illness: none. No other identified aggravating or alleviating factors. Symptoms are constant and overall not improving. She had a virtual visit online with PCP today ( Promotes no decrease in PO intake of fluids. Denies: severe lethargy, severe SOB, syncope/near syncope, vomiting/diarrhea, persistent chest pain, labored breathing, severe headache, fevers . Recent travel: no  Known Exposure to COVID-19: no  Known flu or strep contact: no       Review of Systems - negative except as listed above    Reviewed PmHx, RxHx, FmHx, SocHx, AllgHx and updated in chart. History reviewed. No pertinent family history.     Past Medical History:   Diagnosis Date    Headache       Social History     Socioeconomic History    Marital status: SINGLE     Spouse name: Not on file    Number of children: Not on file    Years of education: Not on file    Highest education level: Not on file   Tobacco Use    Smoking status: Never Smoker    Smokeless tobacco: Never Used   Substance and Sexual Activity    Alcohol use: Yes     Comment: occasional    Drug use: Yes     Types: Marijuana     Comment: Not currently smoking    Sexual activity: Yes     Partners: Male     Birth control/protection: Condom Social Determinants of Health     Financial Resource Strain:     Difficulty of Paying Living Expenses:    Food Insecurity:     Worried About Running Out of Food in the Last Year:     [address] in the Last Year:    Transportation Needs:     Lack of Transportation (Medical):  Lack of Transportation (Non-Medical):    Physical Activity:     Days of Exercise per Week:     Minutes of Exercise per Session:    Stress:     Feeling of Stress :    Social Connections:     Frequency of Communication with Friends and Family:     Frequency of Social Gatherings with Friends and Family:     Attends Taoist Services:     Active Member of Clubs or Organizations:     Attends Club or Organization Meetings:     Marital Status:           Current Outpatient Medications   Medication Sig    predniSONE (DELTASONE) 20 mg tablet Take 2 tabs by mouth one time daily with food for 4 days.  albuterol (PROVENTIL HFA, VENTOLIN HFA, PROAIR HFA) 90 mcg/actuation inhaler Take 2 Puffs by inhalation every four (4) hours as needed for Wheezing.  azithromycin (ZITHROMAX) 250 mg tablet Take 2 tablets on day 1 then 1 tablet per day for remaining 4 days. Take by mouth.  venlafaxine-SR (EFFEXOR-XR) 75 mg capsule Take 2 Caps by mouth daily. Increase from 75mg daily dose.  zolpidem (AMBIEN) 5 mg tablet Take 1-2 Tabs by mouth nightly as needed for Sleep. Max Daily Amount: 10 mg. No current facility-administered medications for this visit. Objective:     Vitals:    06/07/21 1710   Pulse: 94   Resp: 20   Temp: 98.3 °F (36.8 °C)   SpO2: 94%       Physical Exam  General appearance  appears well hydrated and does not appear toxic, no acute distress  Eyes - EOMs intact. Non injected. No scleral icterus   Ears - no external swelling  Nose - nasal congestion. No purulent drainage  Mouth - OP clear without swelling, exudate or lesion. Mucus membranes moist. Uvula midline.   Neck/Lymphatics  trachea midline, full AROM  Chest - Normal breathing effort. Significant wheeze scattered throughout lung fields bilat. Scattered coarse rhonchi also present bilat. Cough sounds loose and productive. Heart - RRR, no murmurs  Skin - no observable rashes or pallor  Neurologic- alert and oriented x 3  Psychiatric- normal mood, behavior and though content. Assessment/ Plan:     1. Wheezy bronchitis    - AMB POC SARS-COV-2  - NOVEL CORONAVIRUS (COVID-19)  - predniSONE (DELTASONE) 20 mg tablet; Take 2 tabs by mouth one time daily with food for 4 days. Dispense: 8 Tablet; Refill: 0  - albuterol (PROVENTIL HFA, VENTOLIN HFA, PROAIR HFA) 90 mcg/actuation inhaler; Take 2 Puffs by inhalation every four (4) hours as needed for Wheezing. Dispense: 1 Inhaler; Refill: 0  - azithromycin (ZITHROMAX) 250 mg tablet; Take 2 tablets on day 1 then 1 tablet per day for remaining 4 days. Take by mouth. Dispense: 6 Tablet; Refill: 0    2. Mild shortness of breath    - AMB POC SARS-COV-2  - NOVEL CORONAVIRUS (COVID-19)  - predniSONE (DELTASONE) 20 mg tablet; Take 2 tabs by mouth one time daily with food for 4 days. Dispense: 8 Tablet; Refill: 0  - albuterol (PROVENTIL HFA, VENTOLIN HFA, PROAIR HFA) 90 mcg/actuation inhaler; Take 2 Puffs by inhalation every four (4) hours as needed for Wheezing. Dispense: 1 Inhaler; Refill: 0  - azithromycin (ZITHROMAX) 250 mg tablet; Take 2 tablets on day 1 then 1 tablet per day for remaining 4 days. Take by mouth. Dispense: 6 Tablet; Refill: 0    Rapid COVID 19 test: NEGATIVE. Will send PCR send off as rapid may represent a false negative. Suspect viral illness based on presentation from onset. due to wheezing and mild SOB to start prednisone burst and albuterol inhaler for symptomatic treatment. Will cover with azithromycin for possible early LRI given biphasic illness pattern. Will hold off on CXR and monitor for clinical improvement; for new, worsening or changes return for CXR.    Severe SOB, dizziness, chest pains, go to ED. Recommendation for self isolation/quarantine based on current CDC guidelines/ until PCR test results. Follow up: We have reviewed worsening/concerning signs and symptoms that may warrant seeking immediate care in the ED setting. For other non-severe changes, non-improvement or questions, patient aware to contact PCP office or consider a virtual online medical consultation.         Christina Perry NP

## 2021-06-09 LAB
SARS-COV-2, NAA 2 DAY TAT: NORMAL
SARS-COV-2, NAA: NOT DETECTED

## 2021-07-06 DIAGNOSIS — F32.A ANXIETY AND DEPRESSION: ICD-10-CM

## 2021-07-06 DIAGNOSIS — F41.9 ANXIETY AND DEPRESSION: ICD-10-CM

## 2021-07-06 DIAGNOSIS — G47.00 INSOMNIA, UNSPECIFIED TYPE: ICD-10-CM

## 2021-07-06 NOTE — TELEPHONE ENCOUNTER
Last visit 06/07/2021 Virtual visit MD Andrew Doyle  Next appointment Nothing scheduled   Previous refill encounter(s)   03/26/2021 Effexor-XR #60 with 2 refills     Requested Prescriptions     Pending Prescriptions Disp Refills    venlafaxine-SR (EFFEXOR-XR) 75 mg capsule 180 Capsule 0     Sig: Take 2 Capsules by mouth daily. Increase from 75mg daily dose.

## 2021-07-09 NOTE — TELEPHONE ENCOUNTER
Writer spoke to patient who states she would like medication prescribed as previously (2 tabs, 75 mg each). Please send pended medication to pharmacy on file. Patient will lose insurance at the end of this month.

## 2021-07-12 RX ORDER — VENLAFAXINE HYDROCHLORIDE 75 MG/1
150 CAPSULE, EXTENDED RELEASE ORAL DAILY
Qty: 180 CAPSULE | Refills: 1 | Status: SHIPPED | OUTPATIENT
Start: 2021-07-12 | End: 2021-11-22 | Stop reason: SDUPTHER

## 2021-07-12 NOTE — TELEPHONE ENCOUNTER
Refill request(s) approved--venlafaxine--as reviewed. Requested Prescriptions     Signed Prescriptions Disp Refills    venlafaxine-SR (EFFEXOR-XR) 75 mg capsule 180 Capsule 1     Sig: Take 2 Capsules by mouth daily. Increase from 75mg daily dose.      Authorizing Provider: Graciela Geiger

## 2021-09-07 ENCOUNTER — APPOINTMENT (OUTPATIENT)
Dept: GENERAL RADIOLOGY | Age: 26
DRG: 189 | End: 2021-09-07
Attending: EMERGENCY MEDICINE
Payer: COMMERCIAL

## 2021-09-07 ENCOUNTER — HOSPITAL ENCOUNTER (INPATIENT)
Age: 26
LOS: 3 days | Discharge: HOME | DRG: 189 | End: 2021-09-10
Attending: EMERGENCY MEDICINE | Admitting: INTERNAL MEDICINE
Payer: COMMERCIAL

## 2021-09-07 ENCOUNTER — APPOINTMENT (OUTPATIENT)
Dept: CT IMAGING | Age: 26
DRG: 189 | End: 2021-09-07
Attending: EMERGENCY MEDICINE
Payer: COMMERCIAL

## 2021-09-07 DIAGNOSIS — J96.01 ACUTE RESPIRATORY FAILURE WITH HYPOXIA (HCC): Primary | ICD-10-CM

## 2021-09-07 DIAGNOSIS — Z20.822 SUSPECTED COVID-19 VIRUS INFECTION: ICD-10-CM

## 2021-09-07 LAB
ALBUMIN SERPL-MCNC: 4.4 G/DL (ref 3.5–5)
ALBUMIN/GLOB SERPL: 1 {RATIO} (ref 1.1–2.2)
ALP SERPL-CCNC: 131 U/L (ref 45–117)
ALT SERPL-CCNC: 50 U/L (ref 12–78)
AMORPH CRY URNS QL MICRO: ABNORMAL
ANION GAP SERPL CALC-SCNC: 13 MMOL/L (ref 5–15)
APPEARANCE UR: ABNORMAL
ARTERIAL PATENCY WRIST A: POSITIVE
AST SERPL-CCNC: 33 U/L (ref 15–37)
BACTERIA URNS QL MICRO: NEGATIVE /HPF
BASE DEFICIT BLD-SCNC: 2 MMOL/L
BASOPHILS # BLD: 0.1 K/UL (ref 0–0.1)
BASOPHILS NFR BLD: 0 % (ref 0–1)
BDY SITE: ABNORMAL
BILIRUB SERPL-MCNC: 0.4 MG/DL (ref 0.2–1)
BILIRUB UR QL: NEGATIVE
BNP SERPL-MCNC: 175 PG/ML (ref 0–125)
BUN SERPL-MCNC: 13 MG/DL (ref 6–20)
BUN/CREAT SERPL: 16 (ref 12–20)
CA-I BLD-MCNC: 1.11 MMOL/L (ref 1.12–1.32)
CALCIUM SERPL-MCNC: 9.3 MG/DL (ref 8.5–10.1)
CHLORIDE BLD-SCNC: 110 MMOL/L (ref 100–108)
CHLORIDE SERPL-SCNC: 104 MMOL/L (ref 97–108)
CO2 BLD-SCNC: 21 MMOL/L (ref 19–24)
CO2 SERPL-SCNC: 26 MMOL/L (ref 21–32)
COLOR UR: ABNORMAL
COVID-19 RAPID TEST, COVR: NOT DETECTED
CREAT SERPL-MCNC: 0.81 MG/DL (ref 0.55–1.02)
CREAT UR-MCNC: 0.6 MG/DL (ref 0.6–1.3)
DIFFERENTIAL METHOD BLD: ABNORMAL
EOSINOPHIL # BLD: 0.1 K/UL (ref 0–0.4)
EOSINOPHIL NFR BLD: 1 % (ref 0–7)
EPITH CASTS URNS QL MICRO: ABNORMAL /LPF
ERYTHROCYTE [DISTWIDTH] IN BLOOD BY AUTOMATED COUNT: 13.2 % (ref 11.5–14.5)
GLOBULIN SER CALC-MCNC: 4.4 G/DL (ref 2–4)
GLUCOSE BLD STRIP.AUTO-MCNC: 108 MG/DL (ref 74–106)
GLUCOSE SERPL-MCNC: 110 MG/DL (ref 65–100)
GLUCOSE UR STRIP.AUTO-MCNC: NEGATIVE MG/DL
HCG UR QL: NEGATIVE
HCO3 BLDA-SCNC: 21 MMOL/L
HCT VFR BLD AUTO: 47.1 % (ref 35–47)
HGB BLD-MCNC: 15.2 G/DL (ref 11.5–16)
HGB UR QL STRIP: NEGATIVE
IMM GRANULOCYTES # BLD AUTO: 0.1 K/UL (ref 0–0.04)
IMM GRANULOCYTES NFR BLD AUTO: 1 % (ref 0–0.5)
KETONES UR QL STRIP.AUTO: >80 MG/DL
LACTATE BLD-SCNC: 0.88 MMOL/L (ref 0.4–2)
LEUKOCYTE ESTERASE UR QL STRIP.AUTO: NEGATIVE
LYMPHOCYTES # BLD: 1.1 K/UL (ref 0.8–3.5)
LYMPHOCYTES NFR BLD: 8 % (ref 12–49)
MCH RBC QN AUTO: 27.5 PG (ref 26–34)
MCHC RBC AUTO-ENTMCNC: 32.3 G/DL (ref 30–36.5)
MCV RBC AUTO: 85.2 FL (ref 80–99)
MONOCYTES # BLD: 1 K/UL (ref 0–1)
MONOCYTES NFR BLD: 7 % (ref 5–13)
NEUTS SEG # BLD: 12.3 K/UL (ref 1.8–8)
NEUTS SEG NFR BLD: 83 % (ref 32–75)
NITRITE UR QL STRIP.AUTO: NEGATIVE
NRBC # BLD: 0.02 K/UL (ref 0–0.01)
NRBC BLD-RTO: 0.1 PER 100 WBC
PCO2 BLD: 29.5 MMHG (ref 35–45)
PH BLD: 7.46 [PH] (ref 7.35–7.45)
PH UR STRIP: 6 [PH] (ref 5–8)
PLATELET # BLD AUTO: 293 K/UL (ref 150–400)
PMV BLD AUTO: 9.8 FL (ref 8.9–12.9)
PO2 BLD: 52 MMHG (ref 80–100)
POTASSIUM BLD-SCNC: 3.9 MMOL/L (ref 3.5–5.5)
POTASSIUM SERPL-SCNC: 4.3 MMOL/L (ref 3.5–5.1)
PROT SERPL-MCNC: 8.8 G/DL (ref 6.4–8.2)
PROT UR STRIP-MCNC: ABNORMAL MG/DL
RBC # BLD AUTO: 5.53 M/UL (ref 3.8–5.2)
RBC #/AREA URNS HPF: ABNORMAL /HPF (ref 0–5)
SARS-COV-2, COV2: NORMAL
SODIUM BLD-SCNC: 140 MMOL/L (ref 136–145)
SODIUM SERPL-SCNC: 143 MMOL/L (ref 136–145)
SOURCE, COVRS: NORMAL
SP GR UR REFRACTOMETRY: >1.03 (ref 1–1.03)
SPECIMEN SITE: ABNORMAL
TROPONIN I SERPL-MCNC: <0.05 NG/ML
UA: UC IF INDICATED,UAUC: ABNORMAL
UROBILINOGEN UR QL STRIP.AUTO: 0.2 EU/DL (ref 0.2–1)
WBC # BLD AUTO: 14.7 K/UL (ref 3.6–11)
WBC URNS QL MICRO: ABNORMAL /HPF (ref 0–4)

## 2021-09-07 PROCEDURE — 96365 THER/PROPH/DIAG IV INF INIT: CPT

## 2021-09-07 PROCEDURE — 84295 ASSAY OF SERUM SODIUM: CPT

## 2021-09-07 PROCEDURE — 83880 ASSAY OF NATRIURETIC PEPTIDE: CPT

## 2021-09-07 PROCEDURE — 96375 TX/PRO/DX INJ NEW DRUG ADDON: CPT

## 2021-09-07 PROCEDURE — 71045 X-RAY EXAM CHEST 1 VIEW: CPT

## 2021-09-07 PROCEDURE — 71275 CT ANGIOGRAPHY CHEST: CPT

## 2021-09-07 PROCEDURE — 36415 COLL VENOUS BLD VENIPUNCTURE: CPT

## 2021-09-07 PROCEDURE — 74011250636 HC RX REV CODE- 250/636: Performed by: EMERGENCY MEDICINE

## 2021-09-07 PROCEDURE — 81025 URINE PREGNANCY TEST: CPT

## 2021-09-07 PROCEDURE — 99285 EMERGENCY DEPT VISIT HI MDM: CPT

## 2021-09-07 PROCEDURE — 74011000636 HC RX REV CODE- 636: Performed by: EMERGENCY MEDICINE

## 2021-09-07 PROCEDURE — 74011000258 HC RX REV CODE- 258: Performed by: EMERGENCY MEDICINE

## 2021-09-07 PROCEDURE — 87635 SARS-COV-2 COVID-19 AMP PRB: CPT

## 2021-09-07 PROCEDURE — 80053 COMPREHEN METABOLIC PANEL: CPT

## 2021-09-07 PROCEDURE — 81001 URINALYSIS AUTO W/SCOPE: CPT

## 2021-09-07 PROCEDURE — 87040 BLOOD CULTURE FOR BACTERIA: CPT

## 2021-09-07 PROCEDURE — 85027 COMPLETE CBC AUTOMATED: CPT

## 2021-09-07 PROCEDURE — 65270000029 HC RM PRIVATE

## 2021-09-07 PROCEDURE — 93005 ELECTROCARDIOGRAM TRACING: CPT

## 2021-09-07 PROCEDURE — 84484 ASSAY OF TROPONIN QUANT: CPT

## 2021-09-07 RX ORDER — SODIUM CHLORIDE 0.9 % (FLUSH) 0.9 %
5-10 SYRINGE (ML) INJECTION AS NEEDED
Status: DISCONTINUED | OUTPATIENT
Start: 2021-09-07 | End: 2021-09-10 | Stop reason: HOSPADM

## 2021-09-07 RX ORDER — ACETAMINOPHEN 325 MG/1
650 TABLET ORAL
Status: DISCONTINUED | OUTPATIENT
Start: 2021-09-07 | End: 2021-09-08 | Stop reason: SDUPTHER

## 2021-09-07 RX ORDER — SODIUM CHLORIDE 0.9 % (FLUSH) 0.9 %
5-40 SYRINGE (ML) INJECTION AS NEEDED
Status: DISCONTINUED | OUTPATIENT
Start: 2021-09-07 | End: 2021-09-10 | Stop reason: HOSPADM

## 2021-09-07 RX ORDER — DEXAMETHASONE SODIUM PHOSPHATE 10 MG/ML
6 INJECTION INTRAMUSCULAR; INTRAVENOUS ONCE
Status: COMPLETED | OUTPATIENT
Start: 2021-09-07 | End: 2021-09-07

## 2021-09-07 RX ORDER — ACETAMINOPHEN 650 MG/1
650 SUPPOSITORY RECTAL
Status: DISCONTINUED | OUTPATIENT
Start: 2021-09-07 | End: 2021-09-10 | Stop reason: HOSPADM

## 2021-09-07 RX ORDER — ONDANSETRON 4 MG/1
4 TABLET, ORALLY DISINTEGRATING ORAL
Status: DISCONTINUED | OUTPATIENT
Start: 2021-09-07 | End: 2021-09-10 | Stop reason: HOSPADM

## 2021-09-07 RX ORDER — SODIUM CHLORIDE 0.9 % (FLUSH) 0.9 %
5-40 SYRINGE (ML) INJECTION EVERY 8 HOURS
Status: DISCONTINUED | OUTPATIENT
Start: 2021-09-08 | End: 2021-09-10 | Stop reason: HOSPADM

## 2021-09-07 RX ORDER — ENOXAPARIN SODIUM 100 MG/ML
40 INJECTION SUBCUTANEOUS DAILY
Status: DISCONTINUED | OUTPATIENT
Start: 2021-09-08 | End: 2021-09-10 | Stop reason: HOSPADM

## 2021-09-07 RX ORDER — POLYETHYLENE GLYCOL 3350 17 G/17G
17 POWDER, FOR SOLUTION ORAL DAILY PRN
Status: DISCONTINUED | OUTPATIENT
Start: 2021-09-07 | End: 2021-09-10 | Stop reason: HOSPADM

## 2021-09-07 RX ORDER — ONDANSETRON 2 MG/ML
4 INJECTION INTRAMUSCULAR; INTRAVENOUS
Status: DISCONTINUED | OUTPATIENT
Start: 2021-09-07 | End: 2021-09-10 | Stop reason: HOSPADM

## 2021-09-07 RX ADMIN — IOPAMIDOL 80 ML: 755 INJECTION, SOLUTION INTRAVENOUS at 20:24

## 2021-09-07 RX ADMIN — SODIUM CHLORIDE 1000 ML: 9 INJECTION, SOLUTION INTRAVENOUS at 19:22

## 2021-09-07 RX ADMIN — SODIUM CHLORIDE 1000 ML: 9 INJECTION, SOLUTION INTRAVENOUS at 19:29

## 2021-09-07 RX ADMIN — PROMETHAZINE HYDROCHLORIDE 25 MG: 25 INJECTION INTRAMUSCULAR; INTRAVENOUS at 19:28

## 2021-09-07 RX ADMIN — DEXAMETHASONE SODIUM PHOSPHATE 6 MG: 10 INJECTION INTRAMUSCULAR; INTRAVENOUS at 19:23

## 2021-09-07 NOTE — Clinical Note
Status[de-identified] INPATIENT [101]   Type of Bed: Remote Telemetry [29]   Cardiac Monitoring Required?: No   Inpatient Hospitalization Certified Necessary for the Following Reasons: 3.  Patient receiving treatment that can only be provided in an inpatient setting (further clarification in H&P documentation)   Admitting Diagnosis: Acute respiratory failure with hypoxia [facility]) [[id number]]   Admitting Physician: Ann Necessary [[id number]]   Attending Physician: Ann Necessary [[id number]]   Estimated Length of Stay: 2 Midnights   Discharge Plan[de-identified] Home with Office Follow-up

## 2021-09-07 NOTE — ED PROVIDER NOTES
Normally healthy 51-year-old female has been sick for the last 5 or so days with cold symptoms of cough and body aches and difficulty breathing and sore throat. She went to urgent care today and they sent her here to rule out a blood clot because she had low oxygen saturation and a high heart rate. She has had some pleuritic pain. She was 88% on room air when she arrived here with a heart rate of almost 130. No known fevers, but she has been vomiting the past couple days and cannot keep anything down, even Pedialyte. No diarrhea. No hemoptysis. No leg swelling. No recent immobilization or surgery or long trips. No history of venous thromboembolism. No active cancer. She has not been vaccinated for Covid. Past Medical History:   Diagnosis Date    Headache        Past Surgical History:   Procedure Laterality Date    HX GYN  08/2016    Speate Uetrus         History reviewed. No pertinent family history. Social History     Socioeconomic History    Marital status: SINGLE     Spouse name: Not on file    Number of children: Not on file    Years of education: Not on file    Highest education level: Not on file   Occupational History    Not on file   Tobacco Use    Smoking status: Never Smoker    Smokeless tobacco: Never Used   Substance and Sexual Activity    Alcohol use: Yes     Comment: occasional    Drug use: Yes     Types: Marijuana     Comment: Not currently smoking    Sexual activity: Yes     Partners: Male     Birth control/protection: Condom   Other Topics Concern    Not on file   Social History Narrative    Not on file     Social Determinants of Health     Financial Resource Strain:     Difficulty of Paying Living Expenses:    Food Insecurity:     Worried About Running Out of Food in the Last Year:     [address] in the Last Year:    Transportation Needs:     Lack of Transportation (Medical):      Lack of Transportation (Non-Medical):    Physical Activity:     Days of Exercise per Week:     Minutes of Exercise per Session:    Stress:     Feeling of Stress :    Social Connections:     Frequency of Communication with Friends and Family:     Frequency of Social Gatherings with Friends and Family:     Attends Yars[employer] Services:     Active Member of Clubs or Organizations:     Attends Club or Organization Meetings:     Marital Status:    Intimate Partner Violence:     Fear of Current or Ex-Partner:     Emotionally Abused:     Physically Abused:     Sexually Abused: ALLERGIES: Amitriptyline, Doxycycline, Erythromycin, and Lunesta [eszopiclone]    Review of Systems   Constitutional: Positive for appetite change, chills and fatigue. Negative for fever. HENT: Positive for congestion and sore throat. Negative for trouble swallowing. Eyes: Negative for visual disturbance. Respiratory: Positive for cough and shortness of breath. Cardiovascular: Positive for chest pain. Gastrointestinal: Positive for nausea and vomiting. Negative for abdominal pain. Genitourinary: Negative for difficulty urinating. Musculoskeletal: Positive for myalgias. Negative for gait problem. Skin: Negative for rash. Neurological: Positive for headaches. Hematological: Does not bruise/bleed easily. Psychiatric/Behavioral: Negative for sleep disturbance. Vitals:    09/07/21 1820   BP: 122/75   Pulse: (!) 128   Resp: 26   Temp: 99.4 °F (37.4 °C)   SpO2: (!) 88%            Physical Exam  Constitutional:       Appearance: Normal appearance. She is not toxic-appearing. Comments: Actively vomiting when I saw her   HENT:      Head: Normocephalic. Nose: Nose normal.      Mouth/Throat:      Mouth: Mucous membranes are moist.   Eyes:      Extraocular Movements: Extraocular movements intact. Conjunctiva/sclera: Conjunctivae normal.   Cardiovascular:      Rate and Rhythm: Tachycardia present. Pulmonary:      Effort: Pulmonary effort is normal. Tachypnea present.  No respiratory distress. Abdominal:      General: Abdomen is flat. Musculoskeletal:         General: Normal range of motion. Right lower leg: No edema. Left lower leg: No edema. Skin:     Findings: No rash. Neurological:      General: No focal deficit present. Mental Status: She is alert. Psychiatric:         Behavior: Behavior normal.          MDM  Number of Diagnoses or Management Options  Diagnosis management comments: EKG at 1830  Sinus tachycardia with normal/borderline right axis deviation at a rate of 114 bpm  TX shortened at 118 ms  QRS and QTc are both within normal limits  [id number]   T wave inversion also seen in leads II, aVF, V3 through V5    When the patient initially came in I thought she had been vaccinated and, because of the urgent care concern for PE, I ordered lab work to rule out PE and ACS, including imaging of a CTA. After speaking with the patient and realizing she is unvaccinated, the story sounds much more concerning for Covid, but PE will still need to be rule out. I think this is more than likely a viral infection, so I am going to hold off on antibiotics for now. I do not think it would be appropriate to give antibiotics to a viral infection. If, however, after further evaluation it becomes apparent that this is more than likely a bacterial pneumonia or some other bacterial process, then I think it would be reasonable to give antibiotics. My history was limited because of her vomiting. She was on a few liters of oxygen via nasal cannula and seemed to be doing better from a respiratory standpoint. I ordered some Phenergan in addition to the Zofran she is already had. She did not want any pain medicine now because she was afraid to vomit. I think it would be reasonable to reassess after the nausea and vomiting has improved. I ordered some Decadron for her hypoxia. She tested negative on the rapid test at urgent care, but I have retested her here.   She may need admission for the new onset of hypoxia    Because she arrived just before my shift ended, I have signed over to my colleague, Dr. Ollie Woo.          Critical Care  Performed by: Azucena Magaña DO  Authorized by: Azucena Magaña DO     Critical care provider statement:     Critical care time (minutes):  32    Critical care time was exclusive of:  Separately billable procedures and treating other patients and teaching time    Critical care was necessary to treat or prevent imminent or life-threatening deterioration of the following conditions:  Respiratory failure, sepsis and dehydration    Critical care was time spent personally by me on the following activities:  Development of treatment plan with patient or surrogate, evaluation of patient's response to treatment, interpretation of cardiac output measurements, obtaining history from patient or surrogate, ordering and performing treatments and interventions, ordering and review of laboratory studies, ordering and review of radiographic studies and pulse oximetry    I assumed direction of critical care for this patient from another provider in my specialty: no

## 2021-09-07 NOTE — ED TRIAGE NOTES
Triage: Thursday pt started having cold-like symptoms but worsening over the past few days. Pt c/o increased chest pain worsen with inspiration. +N/V. +SOB. Denies long car/plane rides, fever.

## 2021-09-07 NOTE — LETTER
Savanna Lamar was seen and treated at [facility] on 9/7/2021 thorugh 9/10/2021  She may return to work on 9/15/2021. If you have any questions or concerns, please don't hesitate to call. [phone] [facility].       SEEMA Salguero

## 2021-09-08 LAB
ARTERIAL PATENCY WRIST A: POSITIVE
ATRIAL RATE: 114 BPM
B PERT DNA SPEC QL NAA+PROBE: NOT DETECTED
BASE DEFICIT BLD-SCNC: 2.1 MMOL/L
BDY SITE: ABNORMAL
BORDETELLA PARAPERTUSSIS PCR, BORPAR: NOT DETECTED
C PNEUM DNA SPEC QL NAA+PROBE: NOT DETECTED
CALCULATED P AXIS, ECG09: 78 DEGREES
CALCULATED R AXIS, ECG10: 86 DEGREES
CALCULATED T AXIS, ECG11: -54 DEGREES
DIAGNOSIS, 93000: NORMAL
FLUAV H1 2009 PAND RNA SPEC QL NAA+PROBE: NOT DETECTED
FLUAV H1 RNA SPEC QL NAA+PROBE: NOT DETECTED
FLUAV H3 RNA SPEC QL NAA+PROBE: NOT DETECTED
FLUAV SUBTYP SPEC NAA+PROBE: NOT DETECTED
FLUBV RNA SPEC QL NAA+PROBE: NOT DETECTED
GAS FLOW.O2 O2 DELIVERY SYS: ABNORMAL L/MIN
HADV DNA SPEC QL NAA+PROBE: NOT DETECTED
HCO3 BLD-SCNC: 22.1 MMOL/L (ref 22–26)
HCOV 229E RNA SPEC QL NAA+PROBE: NOT DETECTED
HCOV HKU1 RNA SPEC QL NAA+PROBE: NOT DETECTED
HCOV NL63 RNA SPEC QL NAA+PROBE: NOT DETECTED
HCOV OC43 RNA SPEC QL NAA+PROBE: NOT DETECTED
HMPV RNA SPEC QL NAA+PROBE: NOT DETECTED
HPIV1 RNA SPEC QL NAA+PROBE: NOT DETECTED
HPIV2 RNA SPEC QL NAA+PROBE: NOT DETECTED
HPIV3 RNA SPEC QL NAA+PROBE: NOT DETECTED
HPIV4 RNA SPEC QL NAA+PROBE: NOT DETECTED
LACTATE BLD-SCNC: 1.15 MMOL/L (ref 0.4–2)
M PNEUMO DNA SPEC QL NAA+PROBE: NOT DETECTED
O2/TOTAL GAS SETTING VFR VENT: 4 %
P-R INTERVAL, ECG05: 118 MS
PCO2 BLD: 35.3 MMHG (ref 35–45)
PH BLD: 7.41 [PH] (ref 7.35–7.45)
PO2 BLD: 70 MMHG (ref 80–100)
PROCALCITONIN SERPL-MCNC: <0.05 NG/ML
Q-T INTERVAL, ECG07: 326 MS
QRS DURATION, ECG06: 82 MS
QTC CALCULATION (BEZET), ECG08: 449 MS
RSV RNA SPEC QL NAA+PROBE: DETECTED
RV+EV RNA SPEC QL NAA+PROBE: NOT DETECTED
SAO2 % BLD: 94.1 % (ref 92–97)
SARS-COV-2 PCR, COVPCR: NOT DETECTED
SPECIMEN TYPE: ABNORMAL
TROPONIN I BLD-MCNC: <0.04 NG/ML (ref 0–0.08)
TROPONIN I SERPL-MCNC: <0.05 NG/ML
VENTRICULAR RATE, ECG03: 114 BPM

## 2021-09-08 PROCEDURE — 36600 WITHDRAWAL OF ARTERIAL BLOOD: CPT

## 2021-09-08 PROCEDURE — 84484 ASSAY OF TROPONIN QUANT: CPT

## 2021-09-08 PROCEDURE — 74011250636 HC RX REV CODE- 250/636: Performed by: FAMILY MEDICINE

## 2021-09-08 PROCEDURE — 65270000029 HC RM PRIVATE

## 2021-09-08 PROCEDURE — 74011250637 HC RX REV CODE- 250/637: Performed by: FAMILY MEDICINE

## 2021-09-08 PROCEDURE — 0202U NFCT DS 22 TRGT SARS-COV-2: CPT

## 2021-09-08 PROCEDURE — 87040 BLOOD CULTURE FOR BACTERIA: CPT

## 2021-09-08 PROCEDURE — 84145 PROCALCITONIN (PCT): CPT

## 2021-09-08 PROCEDURE — 36415 COLL VENOUS BLD VENIPUNCTURE: CPT

## 2021-09-08 PROCEDURE — 74011000258 HC RX REV CODE- 258: Performed by: FAMILY MEDICINE

## 2021-09-08 PROCEDURE — 74011250637 HC RX REV CODE- 250/637: Performed by: NURSE PRACTITIONER

## 2021-09-08 PROCEDURE — 82803 BLOOD GASES ANY COMBINATION: CPT

## 2021-09-08 PROCEDURE — 65660000000 HC RM CCU STEPDOWN

## 2021-09-08 PROCEDURE — 74011250636 HC RX REV CODE- 250/636: Performed by: INTERNAL MEDICINE

## 2021-09-08 PROCEDURE — 74011250637 HC RX REV CODE- 250/637: Performed by: EMERGENCY MEDICINE

## 2021-09-08 PROCEDURE — 83605 ASSAY OF LACTIC ACID: CPT

## 2021-09-08 PROCEDURE — 94762 N-INVAS EAR/PLS OXIMTRY CONT: CPT

## 2021-09-08 RX ORDER — ACETAMINOPHEN 325 MG/1
650 TABLET ORAL
Status: DISCONTINUED | OUTPATIENT
Start: 2021-09-08 | End: 2021-09-10 | Stop reason: HOSPADM

## 2021-09-08 RX ORDER — DIPHENHYDRAMINE HCL 25 MG
25 CAPSULE ORAL
Status: COMPLETED | OUTPATIENT
Start: 2021-09-08 | End: 2021-09-08

## 2021-09-08 RX ORDER — VENLAFAXINE HYDROCHLORIDE 150 MG/1
150 CAPSULE, EXTENDED RELEASE ORAL DAILY
Status: DISCONTINUED | OUTPATIENT
Start: 2021-09-09 | End: 2021-09-08

## 2021-09-08 RX ORDER — VENLAFAXINE HYDROCHLORIDE 37.5 MG/1
150 CAPSULE, EXTENDED RELEASE ORAL DAILY
Status: DISCONTINUED | OUTPATIENT
Start: 2021-09-08 | End: 2021-09-10 | Stop reason: HOSPADM

## 2021-09-08 RX ORDER — ALBUTEROL SULFATE 90 UG/1
1 AEROSOL, METERED RESPIRATORY (INHALATION)
Status: DISCONTINUED | OUTPATIENT
Start: 2021-09-08 | End: 2021-09-08

## 2021-09-08 RX ORDER — SODIUM CHLORIDE 9 MG/ML
75 INJECTION, SOLUTION INTRAVENOUS CONTINUOUS
Status: DISCONTINUED | OUTPATIENT
Start: 2021-09-08 | End: 2021-09-10 | Stop reason: HOSPADM

## 2021-09-08 RX ORDER — SODIUM CHLORIDE 0.9 % (FLUSH) 0.9 %
5-40 SYRINGE (ML) INJECTION EVERY 8 HOURS
Status: DISCONTINUED | OUTPATIENT
Start: 2021-09-08 | End: 2021-09-10 | Stop reason: HOSPADM

## 2021-09-08 RX ORDER — HEPARIN SODIUM 5000 [USP'U]/ML
5000 INJECTION, SOLUTION INTRAVENOUS; SUBCUTANEOUS EVERY 8 HOURS
Status: DISCONTINUED | OUTPATIENT
Start: 2021-09-08 | End: 2021-09-08

## 2021-09-08 RX ORDER — ALBUTEROL SULFATE 90 UG/1
1 AEROSOL, METERED RESPIRATORY (INHALATION)
Status: DISCONTINUED | OUTPATIENT
Start: 2021-09-08 | End: 2021-09-10 | Stop reason: HOSPADM

## 2021-09-08 RX ORDER — SODIUM CHLORIDE 0.9 % (FLUSH) 0.9 %
5-40 SYRINGE (ML) INJECTION AS NEEDED
Status: DISCONTINUED | OUTPATIENT
Start: 2021-09-08 | End: 2021-09-10 | Stop reason: HOSPADM

## 2021-09-08 RX ADMIN — VENLAFAXINE HYDROCHLORIDE 150 MG: 150 CAPSULE, EXTENDED RELEASE ORAL at 20:45

## 2021-09-08 RX ADMIN — Medication 10 ML: at 14:46

## 2021-09-08 RX ADMIN — ENOXAPARIN SODIUM 40 MG: 40 INJECTION SUBCUTANEOUS at 13:31

## 2021-09-08 RX ADMIN — DIPHENHYDRAMINE HYDROCHLORIDE 25 MG: 25 CAPSULE ORAL at 00:48

## 2021-09-08 RX ADMIN — Medication 10 ML: at 22:00

## 2021-09-08 RX ADMIN — CEFTRIAXONE SODIUM 1 G: 1 INJECTION, POWDER, FOR SOLUTION INTRAMUSCULAR; INTRAVENOUS at 14:40

## 2021-09-08 RX ADMIN — Medication 10 ML: at 13:31

## 2021-09-08 RX ADMIN — SODIUM CHLORIDE 75 ML/HR: 9 INJECTION, SOLUTION INTRAVENOUS at 14:41

## 2021-09-08 RX ADMIN — ALBUTEROL SULFATE 1 PUFF: 90 AEROSOL, METERED RESPIRATORY (INHALATION) at 17:54

## 2021-09-08 NOTE — H&P
History & Physical    Primary Care Provider: Jayro Iqbal MD  Source of Information: Patient     History of Presenting Illness:   Marychuy Byers is a 32 y.o. female who presents with shortness of breath    Patient reports her symptoms started 5 days ago, initially she thought it was a cold, she had some cough and body aches associated with her symptoms, patient reports her symptoms kept getting worse, she went to urgent care yesterday and they were concerned about a PE and sent the patient to the ER for further management evaluation. Patient was found to be tachycardic and hypoxic on arrival, ABG was done and patient was found to be hypoxemic, patient also reports some vomiting that has been going on for the past few days cannot keep anything down, tried to take some Pedialyte but that also did not \"agree with her\". Patient is not vaccinated for Covid    [facility] and was requested to be admitted to the hospitalist service    The patient denies any Headache, blurry vision, sore throat, trouble swallowing, trouble with speech,, chills, N/V/D, abd pain, urinary symptoms, constipation, recent travels, sick contacts, focal or generalized neurological symptoms,  falls, injuries, rashes, contact with COVID-19 diagnosed patients, hematemesis, melena, hemoptysis, hematuria, rashes, denies starting any new medications and denies any other concerns or problems besides as mentioned above. Review of Systems:  A comprehensive review of systems was negative except for that written in the History of Present Illness. All other systems reviewed, pertinent positives and negatives noted in HPI    Past Medical History:   Diagnosis Date    Headache       Past Surgical History:   Procedure Laterality Date    HX GYN  08/2016    Swati Martinez     Prior to Admission medications    Medication Sig Start Date End Date Taking?  Authorizing Provider venlafaxine-SR (EFFEXOR-XR) 75 mg capsule Take 2 Capsules by mouth daily. Increase from 75mg daily dose. 7/12/21  Yes Chetna Barrios MD   albuterol (PROVENTIL HFA, VENTOLIN HFA, PROAIR HFA) 90 mcg/actuation inhaler Take 2 Puffs by inhalation every four (4) hours as needed for Wheezing. 6/7/21  Yes Delbert Holguin NP     Allergies   Allergen Reactions    Amitriptyline Other (comments)     Tinnitus. And not effective for sleep or headache mgt.  Doxycycline Rash    Erythromycin Rash    Lunesta [Eszopiclone] Other (comments)     Bad taste and not effective for sleep mgt. History reviewed. No pertinent family history. Family history was discussed with the patient, all pertinent and relevant details are mentioned as above, no other pertinent and relevant family history was noted on my discussion with the patient.   Patient specifically denies any history of Gaucher disease in the family  SOCIAL HISTORY:  Patient resides:  Independently x   Assisted Living    SNF    With family care       Smoking history:   None x   Former    Chronic      Alcohol history:   None    Social x   Chronic      Ambulates:   Independently x   w/cane    w/walker    w/wc    CODE STATUS:  DNR    Full x   Other      Objective:     Physical Exam:     Visit Vitals  /75   Pulse (!) 112   Temp 99 °F (37.2 °C)   Resp 19   Ht 5' 3\" (1.6 m)   Wt 78.9 kg (173 lb 15.1 oz)   LMP 08/26/2021   SpO2 93%   BMI 30.81 kg/m²    O2 Flow Rate (L/min): 4 l/min O2 Device: Nasal cannula    General : alert x 3, awake, no acute distress, resting in bed, pleasant /female, appears to be stated age  HEENT: PEERL, EOMI, moist mucus membrane, TM clear  Neck: supple, no JVD, no meningeal signs  Chest: Decreased basal breath sounds with coarse breath sounds bilateral lung bases  CVS: S1 S2 heard, Capillary refill less than 2 seconds  Abd: soft/ Non tender, non distended, BS physiological,   Ext: no clubbing, no cyanosis, no edema, brisk 2+ DP pulses  Neuro/Psych: pleasant mood and affect, CN 2-12 grossly intact, sensory grossly within normal limit, Strength 5/5 in all extremities, DTR 1+ x 4  Skin: warm     EKG: Sinus tachycardia with nonspecific ST changes      Data Review:     Recent Days:  Recent Labs     09/07/21  1859   WBC 14.7*   HGB 15.2   HCT 47.1*        Recent Labs     09/07/21  1859      K 4.3      CO2 26   *   BUN 13   CREA 0.81   CA 9.3   ALB 4.4   ALT 50     Recent Labs     09/07/21  2140   HCO3 21       24 Hour Results:  Recent Results (from the past 24 hour(s))   EKG, 12 LEAD, INITIAL    Collection Time: 09/07/21  6:30 PM   Result Value Ref Range    Ventricular Rate 114 BPM    Atrial Rate 114 BPM    P-R Interval 118 ms    QRS Duration 82 ms    Q-T Interval 326 ms    QTC Calculation (Bezet) 449 ms    Calculated P Axis 78 degrees    Calculated R Axis 86 degrees    Calculated T Axis -54 degrees    Diagnosis       Sinus tachycardia  Biatrial enlargement  ST & T wave abnormality, consider inferior ischemia  Abnormal ECG  No previous ECGs available     URINALYSIS W/ REFLEX CULTURE    Collection Time: 09/07/21  6:33 PM    Specimen: Urine   Result Value Ref Range    Color YELLOW/STRAW      Appearance TURBID (A) CLEAR      Specific gravity >1.030 (H) 1.003 - 1.030    pH (UA) 6.0 5.0 - 8.0      Protein TRACE (A) NEG mg/dL    Glucose Negative NEG mg/dL    Ketone >80 (A) NEG mg/dL    Bilirubin Negative NEG      Blood Negative NEG      Urobilinogen 0.2 0.2 - 1.0 EU/dL    Nitrites Negative NEG      Leukocyte Esterase Negative NEG      WBC 0-4 0 - 4 /hpf    RBC 0-5 0 - 5 /hpf    Epithelial cells MODERATE (A) FEW /lpf    Bacteria Negative NEG /hpf    UA:UC IF INDICATED CULTURE NOT INDICATED BY UA RESULT      Amorphous Crystals 4+ (A) NEG   COVID-19 RAPID TEST    Collection Time: 09/07/21  6:59 PM   Result Value Ref Range    Specimen source Nasopharyngeal      COVID-19 rapid test Not detected NOTD     SARS-COV-2    Collection Time: 09/07/21  6:59 PM   Result Value Ref Range    SARS-CoV-2 Please find results under separate order     CULTURE, BLOOD    Collection Time: 09/07/21  6:59 PM    Specimen: Blood   Result Value Ref Range    Special Requests: NO SPECIAL REQUESTS      Culture result: NO GROWTH AFTER 9 HOURS     TROPONIN I    Collection Time: 09/07/21  6:59 PM   Result Value Ref Range    Troponin-I, Qt. <0.05 <0.05 ng/mL   NT-PRO BNP    Collection Time: 09/07/21  6:59 PM   Result Value Ref Range    NT pro- (H) 0 - 125 PG/ML   CBC W/O DIFF    Collection Time: 09/07/21  6:59 PM   Result Value Ref Range    WBC 14.7 (H) 3.6 - 11.0 K/uL    RBC 5.53 (H) 3.80 - 5.20 M/uL    HGB 15.2 11.5 - 16.0 g/dL    HCT 47.1 (H) 35.0 - 47.0 %    MCV 85.2 80.0 - 99.0 FL    MCH 27.5 26.0 - 34.0 PG    MCHC 32.3 30.0 - 36.5 g/dL    RDW 13.2 11.5 - 14.5 %    PLATELET 694 452 - 035 K/uL    MPV 9.8 8.9 - 12.9 FL    NRBC 0.1 (H) 0  WBC    ABSOLUTE NRBC 0.02 (H) 0.00 - 0.01 K/uL   DIFFERENTIAL, AUTO    Collection Time: 09/07/21  6:59 PM   Result Value Ref Range    NEUTROPHILS 83 (H) 32 - 75 %    LYMPHOCYTES 8 (L) 12 - 49 %    MONOCYTES 7 5 - 13 %    EOSINOPHILS 1 0 - 7 %    BASOPHILS 0 0 - 1 %    IMMATURE GRANULOCYTES 1 (H) 0.0 - 0.5 %    ABS. NEUTROPHILS 12.3 (H) 1.8 - 8.0 K/UL    ABS. LYMPHOCYTES 1.1 0.8 - 3.5 K/UL    ABS. MONOCYTES 1.0 0.0 - 1.0 K/UL    ABS. EOSINOPHILS 0.1 0.0 - 0.4 K/UL    ABS. BASOPHILS 0.1 0.0 - 0.1 K/UL    ABS. IMM.  GRANS. 0.1 (H) 0.00 - 0.04 K/UL    DF AUTOMATED     METABOLIC PANEL, COMPREHENSIVE    Collection Time: 09/07/21  6:59 PM   Result Value Ref Range    Sodium 143 136 - 145 mmol/L    Potassium 4.3 3.5 - 5.1 mmol/L    Chloride 104 97 - 108 mmol/L    CO2 26 21 - 32 mmol/L    Anion gap 13 5 - 15 mmol/L    Glucose 110 (H) 65 - 100 mg/dL    BUN 13 6 - 20 MG/DL    Creatinine 0.81 0.55 - 1.02 MG/DL    BUN/Creatinine ratio 16 12 - 20      GFR est AA >60 >60 ml/min/1.73m2    GFR est non-AA >60 >60 ml/min/1.73m2    Calcium 9.3 8.5 - 10.1 MG/DL    Bilirubin, total 0.4 0.2 - 1.0 MG/DL    ALT (SGPT) 50 12 - 78 U/L    AST (SGOT) 33 15 - 37 U/L    Alk. phosphatase 131 (H) 45 - 117 U/L    Protein, total 8.8 (H) 6.4 - 8.2 g/dL    Albumin 4.4 3.5 - 5.0 g/dL    Globulin 4.4 (H) 2.0 - 4.0 g/dL    A-G Ratio 1.0 (L) 1.1 - 2.2     HCG URINE, QL. - POC    Collection Time: 09/07/21  7:32 PM   Result Value Ref Range    Pregnancy test,urine (POC) Negative NEG     BLOOD GAS,CHEM8,LACTIC ACID POC    Collection Time: 09/07/21  9:40 PM   Result Value Ref Range    Calcium, ionized (POC) 1.11 (L) 1.12 - 1.32 mmol/L    pH (POC) 7.46 (H) 7.35 - 7.45      pCO2 (POC) 29.5 (L) 35.0 - 45.0 MMHG    pO2 (POC) 52 (L) 80 - 100 MMHG    BICARBONATE 21 mmol/L    Base deficit (POC) 2.0 mmol/L    Sample source ARTERIAL      JAISON'S TEST Positive      CO2, POC 21 19 - 24 MMOL/L    Sodium,  136 - 145 MMOL/L    Potassium, POC 3.9 3.5 - 5.5 MMOL/L    Chloride,  (H) 100 - 108 MMOL/L    Glucose,  (H) 74 - 106 MG/DL    Creatinine, POC 0.6 0.6 - 1.3 MG/DL    Lactic Acid (POC) 0.88 0.40 - 2.00 mmol/L    SITE RIGHT RADIAL     POC G3 - PUL    Collection Time: 09/08/21  1:43 PM   Result Value Ref Range    FIO2 (POC) 4 %    pH (POC) 7.41 7.35 - 7.45      pCO2 (POC) 35.3 35.0 - 45.0 MMHG    pO2 (POC) 70 (L) 80 - 100 MMHG    HCO3 (POC) 22.1 22 - 26 MMOL/L    sO2 (POC) 94.1 92 - 97 %    Base deficit (POC) 2.1 mmol/L    Site LEFT BRACHIAL      Device: NASAL CANNULA      Allens test (POC) Positive      Specimen type (POC) ARTERIAL           Imaging:   CTA CHEST W OR W WO CONT    Result Date: 9/7/2021  1. Study is mildly compromised by motion. No definite pulmonary emboli identified. If symptoms persist follow-up study is recommended. 2. There is a 1 cm nodule in the right breast may represent a small cyst or fibroadenoma. This can be further assessed with ultrasound. .     XR CHEST PORT    Result Date: 9/7/2021  No acute cardiopulmonary process.      Assessment/Plan Acute hypoxemic respiratory failure  Acute bronchitis  Leukocytosis    Patient will be admitted on a telemetry bed  Unclear etiology for respiratory failure, oxygen support, pulse ox monitoring, IV antibiotics empirically, procalcitonin level, CT of the chest does not show any acute PE, pulmonary consult, telemetry, viral panel swab pending echocardiogram, supportive care and further intervention per hospital course, reassess as needed      GI DVT prophylaxis: Patient will be on Lovenox               Please note that this dictation was completed with [employer], the computer voice recognition software. Quite often unanticipated grammatical, syntax, homophones, and other interpretive errors are inadvertently transcribed by the computer software. Please disregard these errors. Please excuse any errors that have escaped final proofreading.          Signed By: Corrie St MD     September 8, 2021

## 2021-09-08 NOTE — ED TRIAGE NOTES
Pt came from [facility] because of sob and satting in at 88%.   Pt not complaining of pain right now, no chest pain right now, and no n/v.

## 2021-09-08 NOTE — ED NOTES
Talked to night np about changing pt venalfaxine order to evening and starting today, due to pt request

## 2021-09-08 NOTE — ED NOTES
Patient was a difficult stick for labs and current IV not drawing back for labs. Extra sample collected for lactic EPOC. Per MD Blanco Persons, okay to complete POC troponin at this time instead of formal lab sample. POC troponin running.

## 2021-09-08 NOTE — ED NOTES
TRANSFER - OUT REPORT:    Verbal report given to Tyra Alcazar RN (name) on Nettie Mo  being transferred to ED (unit) for routine progression of care       Report consisted of patients Situation, Background, Assessment and   Recommendations(SBAR). Information from the following report(s) SBAR, Kardex and ED Summary was reviewed with the receiving nurse. Lines:   Peripheral IV 09/07/21 Left Forearm (Active)   Site Assessment Clean, dry, & intact 09/07/21 1832   Phlebitis Assessment 0 09/07/21 1832   Infiltration Assessment 0 09/07/21 1832   Dressing Status Clean, dry, & intact 09/07/21 1832   Dressing Type Tape;Transparent 09/07/21 1832   Hub Color/Line Status Pink;Flushed 09/07/21 1832   Action Taken Blood drawn 09/07/21 1832        Opportunity for questions and clarification was provided.       Patient transported with:   Monitor  O2 @ 2 liters

## 2021-09-08 NOTE — ED NOTES
PROGRESS NOTE:    Pt has been reexamined by me. all available results have been reviewed with pt and any available family. Pt understands sx, dx, and tx in ED. Care plan has been outlined and questions have been answered. Pt and any available family understands and agrees to need for admission to hospital for further tx not available in ED. Pt is ready for admission. Written by Lexus Pan MD,  10:49 PM    CONSULT NOTE:  I spoke with Dr. Sharda Ramirez of the adult hospitalist team. Discussed patient's presentation, history, physical assessment, and available diagnostic results. He will evaluate, write orders and admit the patient to the hospital. 10:49 PM    [facility] Consult for Admission  10:49 PM    ED Room Number: SER05/05  Patient Name and age:  Stephenie Colbert 32 y.o.  female  Working Diagnosis:   1. Acute respiratory failure with hypoxia ([address]    2. Suspected COVID-19 virus infection        COVID-19 Suspicion:  yes  Sepsis present:  no  Reassessment needed: no  Code Status:  Full Code  Readmission: no  Isolation Requirements:  yes  Recommended Level of Care:  telemetry  Department:  [facility] Adult ED - 21     Other: Total critical care time spent exclusive of procedures:  45 minutes.

## 2021-09-08 NOTE — CONSULTS
Pulmonary    Reason:  Hypoxemia  Requesting:  Dr Amilcar Garcia reviewed / images viewed    31 yo female  has a past medical history of Headache.   with 5 day h/o URI sx and shortness of breath. Was seen at urgent care and concern for PE and was sent to ER    CTA  - no PE, no acute process  Rapid covid test negative     Patient Vitals for the past 4 hrs:   BP Temp Pulse Resp SpO2 Height Weight   21 1330 108/75  (!) 112 19 93 %     21 1300 122/74  (!) 115 29 94 %     21 1241 118/81 99 °F (37.2 °C) (!) 102 26 92 % 5' 3\" (1.6 m) 78.9 kg (173 lb 15.1 oz)   Temp (24hrs), Av.6 °F (37 °C), Min:98 °F (36.7 °C), Max:99.4 °F (37.4 °C)  No intake or output data in the 24 hours ending 21 1421    Lab:  Recent Labs     21  1859   WBC 14.7*   HGB 15.2         K 4.3      CO2 26   BUN 13   CREA 0.81   *   CA 9.3   TROIQ <0.05   TBILI 0.4     ABG:  Recent Labs     21  1343 21  2140   PHI 7.41 7.46*   PCO2I 35.3 29.5*   PO2I 70* 52*   HCO3I 22.1  --    SO2I 94.1  --    FIO2I 4  --      Pulmonary Impression / Recommendations    Acute hypoxemic resp failure - initial covid test negative. No obvious PE. Sx developed in the setting of URI - ?  Bronchitis    --abx  --bronchodilators  --viral panel pnd  --consider steroids  --wean O2 by sats  --outpatient PFTs - possible asthma gretchen Magaña MD

## 2021-09-09 ENCOUNTER — APPOINTMENT (OUTPATIENT)
Dept: NON INVASIVE DIAGNOSTICS | Age: 26
DRG: 189 | End: 2021-09-09
Attending: FAMILY MEDICINE
Payer: COMMERCIAL

## 2021-09-09 LAB
ALBUMIN SERPL-MCNC: 3.4 G/DL (ref 3.5–5)
ALBUMIN/GLOB SERPL: 0.9 {RATIO} (ref 1.1–2.2)
ALP SERPL-CCNC: 97 U/L (ref 45–117)
ALT SERPL-CCNC: 45 U/L (ref 12–78)
ANION GAP SERPL CALC-SCNC: 4 MMOL/L (ref 5–15)
AST SERPL-CCNC: 29 U/L (ref 15–37)
ATRIAL RATE: 61 BPM
BASOPHILS # BLD: 0.1 K/UL (ref 0–0.1)
BASOPHILS NFR BLD: 1 % (ref 0–1)
BILIRUB SERPL-MCNC: 0.4 MG/DL (ref 0.2–1)
BUN SERPL-MCNC: 17 MG/DL (ref 6–20)
BUN/CREAT SERPL: 25 (ref 12–20)
CALCIUM SERPL-MCNC: 8.2 MG/DL (ref 8.5–10.1)
CALCULATED P AXIS, ECG09: 77 DEGREES
CALCULATED R AXIS, ECG10: 84 DEGREES
CALCULATED T AXIS, ECG11: 63 DEGREES
CHLORIDE SERPL-SCNC: 111 MMOL/L (ref 97–108)
CO2 SERPL-SCNC: 25 MMOL/L (ref 21–32)
CREAT SERPL-MCNC: 0.67 MG/DL (ref 0.55–1.02)
DIAGNOSIS, 93000: NORMAL
DIFFERENTIAL METHOD BLD: ABNORMAL
EOSINOPHIL # BLD: 0.5 K/UL (ref 0–0.4)
EOSINOPHIL NFR BLD: 4 % (ref 0–7)
ERYTHROCYTE [DISTWIDTH] IN BLOOD BY AUTOMATED COUNT: 13.5 % (ref 11.5–14.5)
GLOBULIN SER CALC-MCNC: 4 G/DL (ref 2–4)
GLUCOSE SERPL-MCNC: 82 MG/DL (ref 65–100)
HCT VFR BLD AUTO: 38.3 % (ref 35–47)
HGB BLD-MCNC: 12.7 G/DL (ref 11.5–16)
IMM GRANULOCYTES # BLD AUTO: 0.1 K/UL (ref 0–0.04)
IMM GRANULOCYTES NFR BLD AUTO: 1 % (ref 0–0.5)
LYMPHOCYTES # BLD: 3.6 K/UL (ref 0.8–3.5)
LYMPHOCYTES NFR BLD: 30 % (ref 12–49)
MCH RBC QN AUTO: 28.1 PG (ref 26–34)
MCHC RBC AUTO-ENTMCNC: 33.2 G/DL (ref 30–36.5)
MCV RBC AUTO: 84.7 FL (ref 80–99)
MONOCYTES # BLD: 1.2 K/UL (ref 0–1)
MONOCYTES NFR BLD: 10 % (ref 5–13)
NEUTS SEG # BLD: 6.7 K/UL (ref 1.8–8)
NEUTS SEG NFR BLD: 54 % (ref 32–75)
NRBC # BLD: 0 K/UL (ref 0–0.01)
NRBC BLD-RTO: 0 PER 100 WBC
P-R INTERVAL, ECG05: 142 MS
PLATELET # BLD AUTO: 243 K/UL (ref 150–400)
PMV BLD AUTO: 9.5 FL (ref 8.9–12.9)
POTASSIUM SERPL-SCNC: 3.6 MMOL/L (ref 3.5–5.1)
PROT SERPL-MCNC: 7.4 G/DL (ref 6.4–8.2)
Q-T INTERVAL, ECG07: 424 MS
QRS DURATION, ECG06: 84 MS
QTC CALCULATION (BEZET), ECG08: 426 MS
RBC # BLD AUTO: 4.52 M/UL (ref 3.8–5.2)
SODIUM SERPL-SCNC: 140 MMOL/L (ref 136–145)
VENTRICULAR RATE, ECG03: 61 BPM
WBC # BLD AUTO: 12.2 K/UL (ref 3.6–11)

## 2021-09-09 PROCEDURE — 74011000258 HC RX REV CODE- 258: Performed by: FAMILY MEDICINE

## 2021-09-09 PROCEDURE — 36415 COLL VENOUS BLD VENIPUNCTURE: CPT

## 2021-09-09 PROCEDURE — 93005 ELECTROCARDIOGRAM TRACING: CPT

## 2021-09-09 PROCEDURE — 74011250637 HC RX REV CODE- 250/637: Performed by: NURSE PRACTITIONER

## 2021-09-09 PROCEDURE — 74011250636 HC RX REV CODE- 250/636: Performed by: FAMILY MEDICINE

## 2021-09-09 PROCEDURE — 74011250636 HC RX REV CODE- 250/636: Performed by: INTERNAL MEDICINE

## 2021-09-09 PROCEDURE — 65660000000 HC RM CCU STEPDOWN

## 2021-09-09 PROCEDURE — 80053 COMPREHEN METABOLIC PANEL: CPT

## 2021-09-09 PROCEDURE — 85025 COMPLETE CBC W/AUTO DIFF WBC: CPT

## 2021-09-09 RX ORDER — PREDNISONE 20 MG/1
40 TABLET ORAL
Status: DISCONTINUED | OUTPATIENT
Start: 2021-09-09 | End: 2021-09-10 | Stop reason: HOSPADM

## 2021-09-09 RX ORDER — ALBUTEROL SULFATE 0.83 MG/ML
2.5 SOLUTION RESPIRATORY (INHALATION)
Status: DISCONTINUED | OUTPATIENT
Start: 2021-09-09 | End: 2021-09-10 | Stop reason: HOSPADM

## 2021-09-09 RX ADMIN — VENLAFAXINE HYDROCHLORIDE 150 MG: 150 CAPSULE, EXTENDED RELEASE ORAL at 20:23

## 2021-09-09 RX ADMIN — ENOXAPARIN SODIUM 40 MG: 40 INJECTION SUBCUTANEOUS at 08:36

## 2021-09-09 RX ADMIN — SODIUM CHLORIDE 75 ML/HR: 9 INJECTION, SOLUTION INTRAVENOUS at 08:33

## 2021-09-09 RX ADMIN — CEFTRIAXONE SODIUM 1 G: 1 INJECTION, POWDER, FOR SOLUTION INTRAMUSCULAR; INTRAVENOUS at 16:09

## 2021-09-09 RX ADMIN — Medication 10 ML: at 21:26

## 2021-09-09 NOTE — PROGRESS NOTES
TRANSFER - OUT REPORT:    Verbal report given to 6e nurse (name) on Jaylin Renae  being transferred to  6E(unit) for routine progression of care       Report consisted of patients Situation, Background, Assessment and   Recommendations(SBAR). Information from the following report(s) ED Summary was reviewed with the receiving nurse. Lines:   Peripheral IV 09/07/21 Left Forearm (Active)   Site Assessment Clean, dry, & intact 09/09/21 0800   Phlebitis Assessment 0 09/09/21 0800   Infiltration Assessment 0 09/09/21 0800   Dressing Status Clean, dry, & intact 09/09/21 0800   Dressing Type Transparent 09/09/21 0800   Hub Color/Line Status Flushed 09/09/21 0800   Action Taken Open ports on tubing capped 09/09/21 0800   Alcohol Cap Used Yes 09/09/21 0800       Peripheral IV 09/08/21 Left Antecubital (Active)   Site Assessment Clean, dry, & intact 09/09/21 0800   Phlebitis Assessment 0 09/09/21 0800   Infiltration Assessment 0 09/09/21 0800   Dressing Status Clean, dry, & intact 09/09/21 0800   Dressing Type Transparent 09/09/21 0800   Hub Color/Line Status Infusing 09/09/21 0800   Action Taken Open ports on tubing capped 09/09/21 0800   Alcohol Cap Used Yes 09/09/21 0800        Opportunity for questions and clarification was provided.       Patient transported with:   transport

## 2021-09-09 NOTE — PROGRESS NOTES
9/9/2021 -   TRANSITIONS OF CARE PLAN:   1. RUR: 9%  2. DESTINATION: Likely own home  3. TRANSPORT: Spouse, Doug  4. NEEDS FOR DISCHARGE: TBD - monitor for Home O2 Needs  5. ANTICIPATED FOLLOW UPS: PCP, Pulmonary  6. ONGOING INPATIENT NEEDS: O2 Supplementation with weaning as able, pulse ox monitoring, ABX, Echo, Bronchodilators, possible Steroid plan    Anticipated Discharge is: 24-48 Hours    Reason for Admission:  Acute Respiratory Failure with Hypoxia                   RUR Score:        9%             Plan for utilizing home health:     TBD     PCP: First and Last name:  Lul Cosby MD     [facility] of [facility]: [facility]   Are you a current patient: Yes/No: Yes   Approximate date of last visit: 6/7   Can you participate in a virtual visit with your PCP: Yes                    Current Advanced Directive/Advance Care Plan: Full Code      Healthcare Decision Maker:   Click here to complete [place] including selection of the Healthcare Decision Maker Relationship (ie \"Primary\")           Primary Decision Maker: Skylar Saldivarut - spouse: [phone]                  Transition of Care Plan:  CM notes that patient is currently on Contact and Droplet Plus Precautions due to RSV dx. CM contacted patient   . At baseline, patient is fully independent in ADLs, to include driving. Patient lives with spouse in a 4th floor apartment, 36-42 exterior steps, no interior steps. Patient has no home DME. Patient has no hx of HH or Rehab. Pharmacy preference is [facility] at [employer]. Patient does not have an AMD but is legally . Osman Marcano is spouse, Skylar Saldivarut. Likely disposition is for discharge to own home with transport and home support via spouse. Care Management Interventions  PCP Verified by CM: Yes (followed by Dr. Norberta Cooks)  Last Visit to PCP: 06/07/21  Palliative Care Criteria Met (RRAT>21 & CHF Dx)?: No  Mode of Transport at Discharge:  Other (see comment) (spouse)  Transition of Care Consult (CM Consult): Discharge Planning  MyChart Signup: Yes  Discharge Durable Medical Equipment: No (none)  Health Maintenance Reviewed: Yes (cm spoke with patient by phone)  Physical Therapy Consult: No  Occupational Therapy Consult: No  Speech Therapy Consult: No  Support Systems: Spouse/Significant Other, Parent(s)  Confirm Follow Up Transport: Self (independent in ADLs, to include driving)  [employer] Provided?: No  Discharge Location  Discharge Placement: Home with family assistance (lives with spouse in a 4th floor apartment, 36-42 exterior steps)  CRM: Kae Jesus, MPH, [address] [place] [address]; Z: [phone]

## 2021-09-09 NOTE — PROGRESS NOTES
[facility] Adult  Hospitalist Group                                                                                          Hospitalist Progress Note  Michael Melgoza [address]  Answering service: [phone] OR [phone] from in house phone        Date of Service:  2021  NAME:  Roberto Pagan  :  1995  MRN:  [id number]      Admission Summary:   33 y/o female presented on 21 with shortness of breath. Patient reports 5 day hx of SOB, cough and body aches. Patient noted to be hypoxic and tachycardic on admission. Respiratory viral panel + for RSV. Interval history / Subjective:   Patient examined at bedside, patient reports feeling \"much better\" today. O2 requirements now 2L nc down from 4L nc. O2 saturations remain in mid to high 90s  Wheezing and rhonci, noted on auscultation. If wheezing improves and O2 needs continue to decrease after initiation of steroids will likely D/C home tomorrow. Assessment & Plan:     # Acute hypoxemic respiratory failure, RSV +     - Continue O2 support, wean as tolerated     - Prednisone 40 mg X 5 days     - Continue prn albuterol      - Continue supportive care  # Acute bronchitis     - continue as above  # Leukocytosis     - continue ceftiraxone     - Daily CBC, BMP      Code status: Full code  DVT prophylaxis: Lovenox    Care Plan discussed with: Patient/Family  Anticipated Disposition: Home w/Family  Anticipated Discharge: Less than 24 hours     Hospital Problems  Date Reviewed: 2021        Codes Class Noted POA    Acute respiratory failure with hypoxia [facility]) ICD-10-CM: J96.01  ICD-9-CM: 518.81  2021 Unknown                Review of Systems:   Pertinent items are noted in HPI. Vital Signs:    Last 24hrs VS reviewed since prior progress note.  Most recent are:  Visit Vitals  /73 (BP 1 Location: Right upper arm, BP Patient Position: At rest)   Pulse 79   Temp 98.1 °F (36.7 °C)   Resp 20   Ht 5' 3\" (1.6 m)   Wt 78.9 kg (173 lb 15.1 oz)   SpO2 92%   BMI 30.81 kg/m²       No intake or output data in the 24 hours ending 09/09/21 1342     Physical Examination:     I had a face to face encounter with this patient and independently examined them on 9/9/2021 as outlined below:          Constitutional:  No acute distress, cooperative, pleasant    ENT:  Oral mucosa moist, oropharynx benign. Resp:  CTA bilaterally. + wheezing/rhonchi/rales. No accessory muscle use   CV:  Regular rhythm, normal rate, no murmurs, S1, S2    GI:  Soft, non distended, non tender. normoactive bowel sounds, no hepatosplenomegaly     Musculoskeletal:  No edema, warm, 2+ pulses throughout    Neurologic:  Moves all extremities. AAOx3, CN II-XII reviewed            Data Review:    Review and/or order of clinical lab test  Review and/or order of tests in the radiology section of CPT  Review and/or order of tests in the medicine section of CPT      Labs:     Recent Labs     09/09/21 0455 09/07/21 1859   WBC 12.2* 14.7*   HGB 12.7 15.2   HCT 38.3 47.1*    293     Recent Labs     09/09/21 0455 09/07/21 1859    143   K 3.6 4.3   * 104   CO2 25 26   BUN 17 13   CREA 0.67 0.81   GLU 82 110*   CA 8.2* 9.3     Recent Labs     09/09/21 0455 09/07/21 1859   ALT 45 50   AP 97 131*   TBILI 0.4 0.4   TP 7.4 8.8*   ALB 3.4* 4.4   GLOB 4.0 4.4*     No results for input(s): INR, PTP, APTT, INREXT in the last 72 hours. No results for input(s): FE, TIBC, PSAT, FERR in the last 72 hours. No results found for: FOL, RBCF   No results for input(s): PH, PCO2, PO2 in the last 72 hours.   Recent Labs     09/08/21 2029 09/07/21 1859   TROIQ <0.05 <0.05     No results found for: CHOL, CHOLX, CHLST, CHOLV, HDL, HDLP, LDL, LDLC, DLDLP, TGLX, TRIGL, TRIGP, CHHD, CHHDX  Lab Results   Component Value Date/Time    Glucose,  (H) 09/07/2021 09:40 PM     Lab Results   Component Value Date/Time    Color YELLOW/STRAW 09/07/2021 06:33 PM    Appearance TURBID (A) 09/07/2021 06:33 PM    Specific gravity >1.030 (H) 09/07/2021 06:33 PM    pH (UA) 6.0 09/07/2021 06:33 PM    Protein TRACE (A) 09/07/2021 06:33 PM    Glucose Negative 09/07/2021 06:33 PM    Ketone >80 (A) 09/07/2021 06:33 PM    Bilirubin Negative 09/07/2021 06:33 PM    Urobilinogen 0.2 09/07/2021 06:33 PM    Nitrites Negative 09/07/2021 06:33 PM    Leukocyte Esterase Negative 09/07/2021 06:33 PM    Epithelial cells MODERATE (A) 09/07/2021 06:33 PM    Bacteria Negative 09/07/2021 06:33 PM    WBC 0-4 09/07/2021 06:33 PM    RBC 0-5 09/07/2021 06:33 PM         Medications Reviewed:     Current Facility-Administered Medications   Medication Dose Route Frequency    albuterol (PROVENTIL VENTOLIN) nebulizer solution 2.5 mg  2.5 mg Nebulization Q6H PRN    predniSONE (DELTASONE) tablet 40 mg  40 mg Oral DAILY WITH BREAKFAST    sodium chloride (NS) flush 5-40 mL  5-40 mL IntraVENous Q8H    sodium chloride (NS) flush 5-40 mL  5-40 mL IntraVENous PRN    0.9% sodium chloride infusion  75 mL/hr IntraVENous CONTINUOUS    cefTRIAXone (ROCEPHIN) 1 g in 0.9% sodium chloride (MBP/ADV) 50 mL MBP  1 g IntraVENous Q24H    acetaminophen (TYLENOL) tablet 650 mg  650 mg Oral Q4H PRN    albuterol (PROVENTIL HFA, VENTOLIN HFA, PROAIR HFA) inhaler 1 Puff  1 Puff Inhalation Q4H PRN    venlafaxine-SR (EFFEXOR-XR) capsule 150 mg  150 mg Oral DAILY    sodium chloride (NS) flush 5-10 mL  5-10 mL IntraVENous PRN    sodium chloride (NS) flush 5-40 mL  5-40 mL IntraVENous Q8H    sodium chloride (NS) flush 5-40 mL  5-40 mL IntraVENous PRN    acetaminophen (TYLENOL) suppository 650 mg  650 mg Rectal Q6H PRN    polyethylene glycol (MIRALAX) packet 17 g  17 g Oral DAILY PRN    ondansetron (ZOFRAN ODT) tablet 4 mg  4 mg Oral Q8H PRN    Or    ondansetron (ZOFRAN) injection 4 mg  4 mg IntraVENous Q6H PRN    enoxaparin (LOVENOX) injection 40 mg  40 mg SubCUTAneous DAILY     ______________________________________________________________________  EXPECTED LENGTH OF STAY: 3d 0h  ACTUAL LENGTH OF STAY:          [address], FNP

## 2021-09-09 NOTE — PROGRESS NOTES
TRANSFER - IN REPORT:    Verbal report received from ALISA Farias(name) on Yassine Pierce  being received from ED(unit) for routine progression of care      Report consisted of patients Situation, Background, Assessment and   Recommendations(SBAR). Information from the following report(s) SBAR, Kardex, [facility] and Recent Results was reviewed with the receiving nurse. Opportunity for questions and clarification was provided. Assessment completed upon patients arrival to unit and care assumed.

## 2021-09-09 NOTE — ED NOTES
Bedside and Verbal shift change report given to Radha Harper (oncoming nurse) by Lara Hwang (offgoing nurse). Report included the following information SBAR, Kardex, ED Summary, Intake/Output, MAR and Recent Results.

## 2021-09-10 ENCOUNTER — APPOINTMENT (OUTPATIENT)
Dept: GENERAL RADIOLOGY | Age: 26
DRG: 189 | End: 2021-09-10
Attending: NURSE PRACTITIONER
Payer: COMMERCIAL

## 2021-09-10 ENCOUNTER — APPOINTMENT (OUTPATIENT)
Dept: NON INVASIVE DIAGNOSTICS | Age: 26
DRG: 189 | End: 2021-09-10
Attending: FAMILY MEDICINE
Payer: COMMERCIAL

## 2021-09-10 VITALS
WEIGHT: 173.94 LBS | RESPIRATION RATE: 17 BRPM | DIASTOLIC BLOOD PRESSURE: 65 MMHG | BODY MASS INDEX: 30.82 KG/M2 | HEIGHT: 63 IN | SYSTOLIC BLOOD PRESSURE: 123 MMHG | TEMPERATURE: 98.6 F | OXYGEN SATURATION: 95 % | HEART RATE: 91 BPM

## 2021-09-10 LAB
ANION GAP SERPL CALC-SCNC: 8 MMOL/L (ref 5–15)
BASOPHILS # BLD: 0.1 K/UL (ref 0–0.1)
BASOPHILS NFR BLD: 1 % (ref 0–1)
BUN SERPL-MCNC: 10 MG/DL (ref 6–20)
BUN/CREAT SERPL: 17 (ref 12–20)
CALCIUM SERPL-MCNC: 8.5 MG/DL (ref 8.5–10.1)
CHLORIDE SERPL-SCNC: 108 MMOL/L (ref 97–108)
CO2 SERPL-SCNC: 24 MMOL/L (ref 21–32)
CREAT SERPL-MCNC: 0.59 MG/DL (ref 0.55–1.02)
DIFFERENTIAL METHOD BLD: ABNORMAL
EOSINOPHIL # BLD: 1.3 K/UL (ref 0–0.4)
EOSINOPHIL NFR BLD: 10 % (ref 0–7)
ERYTHROCYTE [DISTWIDTH] IN BLOOD BY AUTOMATED COUNT: 12.9 % (ref 11.5–14.5)
GLUCOSE SERPL-MCNC: 81 MG/DL (ref 65–100)
HCT VFR BLD AUTO: 41.3 % (ref 35–47)
HGB BLD-MCNC: 13.3 G/DL (ref 11.5–16)
IMM GRANULOCYTES # BLD AUTO: 0.1 K/UL (ref 0–0.04)
IMM GRANULOCYTES NFR BLD AUTO: 1 % (ref 0–0.5)
LYMPHOCYTES # BLD: 2.7 K/UL (ref 0.8–3.5)
LYMPHOCYTES NFR BLD: 21 % (ref 12–49)
MCH RBC QN AUTO: 27.6 PG (ref 26–34)
MCHC RBC AUTO-ENTMCNC: 32.2 G/DL (ref 30–36.5)
MCV RBC AUTO: 85.7 FL (ref 80–99)
MONOCYTES # BLD: 0.9 K/UL (ref 0–1)
MONOCYTES NFR BLD: 7 % (ref 5–13)
NEUTS SEG # BLD: 7.6 K/UL (ref 1.8–8)
NEUTS SEG NFR BLD: 60 % (ref 32–75)
NRBC # BLD: 0 K/UL (ref 0–0.01)
NRBC BLD-RTO: 0 PER 100 WBC
PLATELET # BLD AUTO: 242 K/UL (ref 150–400)
PMV BLD AUTO: 9.5 FL (ref 8.9–12.9)
POTASSIUM SERPL-SCNC: 3.9 MMOL/L (ref 3.5–5.1)
RBC # BLD AUTO: 4.82 M/UL (ref 3.8–5.2)
RBC MORPH BLD: ABNORMAL
SODIUM SERPL-SCNC: 140 MMOL/L (ref 136–145)
TSH SERPL DL<=0.05 MIU/L-ACNC: 1.62 UIU/ML (ref 0.36–3.74)
WBC # BLD AUTO: 12.7 K/UL (ref 3.6–11)

## 2021-09-10 PROCEDURE — 77030027138 HC INCENT SPIROMETER -A

## 2021-09-10 PROCEDURE — 74011250637 HC RX REV CODE- 250/637: Performed by: NURSE PRACTITIONER

## 2021-09-10 PROCEDURE — 85025 COMPLETE CBC W/AUTO DIFF WBC: CPT

## 2021-09-10 PROCEDURE — 80048 BASIC METABOLIC PNL TOTAL CA: CPT

## 2021-09-10 PROCEDURE — 74011250636 HC RX REV CODE- 250/636: Performed by: FAMILY MEDICINE

## 2021-09-10 PROCEDURE — 36415 COLL VENOUS BLD VENIPUNCTURE: CPT

## 2021-09-10 PROCEDURE — 74011000258 HC RX REV CODE- 258: Performed by: FAMILY MEDICINE

## 2021-09-10 PROCEDURE — 74011250637 HC RX REV CODE- 250/637: Performed by: INTERNAL MEDICINE

## 2021-09-10 PROCEDURE — 74011250636 HC RX REV CODE- 250/636: Performed by: INTERNAL MEDICINE

## 2021-09-10 PROCEDURE — 84443 ASSAY THYROID STIM HORMONE: CPT

## 2021-09-10 PROCEDURE — 71045 X-RAY EXAM CHEST 1 VIEW: CPT

## 2021-09-10 PROCEDURE — 74011636637 HC RX REV CODE- 636/637: Performed by: NURSE PRACTITIONER

## 2021-09-10 RX ORDER — CEPHALEXIN 500 MG/1
500 CAPSULE ORAL 3 TIMES DAILY
Status: DISCONTINUED | OUTPATIENT
Start: 2021-09-10 | End: 2021-09-10 | Stop reason: HOSPADM

## 2021-09-10 RX ORDER — PREDNISONE 20 MG/1
20 TABLET ORAL
Qty: 1 TABLET | Refills: 0 | Status: SHIPPED | OUTPATIENT
Start: 2021-09-13 | End: 2021-09-10 | Stop reason: SDUPTHER

## 2021-09-10 RX ORDER — CEPHALEXIN 500 MG/1
500 CAPSULE ORAL 3 TIMES DAILY
Qty: 9 CAPSULE | Refills: 0 | Status: SHIPPED | OUTPATIENT
Start: 2021-09-10 | End: 2021-09-10

## 2021-09-10 RX ORDER — CEPHALEXIN 500 MG/1
500 CAPSULE ORAL 3 TIMES DAILY
Qty: 9 CAPSULE | Refills: 0 | Status: SHIPPED | OUTPATIENT
Start: 2021-09-10 | End: 2021-09-13

## 2021-09-10 RX ORDER — PREDNISONE 20 MG/1
40 TABLET ORAL
Qty: 4 TABLET | Refills: 0 | Status: SHIPPED | OUTPATIENT
Start: 2021-09-10 | End: 2021-09-10 | Stop reason: SDUPTHER

## 2021-09-10 RX ORDER — PREDNISONE 20 MG/1
40 TABLET ORAL
Qty: 4 TABLET | Refills: 0 | Status: SHIPPED | OUTPATIENT
Start: 2021-09-10 | End: 2021-09-12

## 2021-09-10 RX ORDER — LORAZEPAM 0.5 MG/1
0.5 TABLET ORAL ONCE
Status: COMPLETED | OUTPATIENT
Start: 2021-09-10 | End: 2021-09-10

## 2021-09-10 RX ORDER — PREDNISONE 20 MG/1
20 TABLET ORAL
Qty: 1 TABLET | Refills: 0 | Status: SHIPPED | OUTPATIENT
Start: 2021-09-13 | End: 2021-09-14

## 2021-09-10 RX ADMIN — Medication 10 ML: at 14:19

## 2021-09-10 RX ADMIN — ENOXAPARIN SODIUM 40 MG: 40 INJECTION SUBCUTANEOUS at 08:27

## 2021-09-10 RX ADMIN — LORAZEPAM 0.5 MG: 0.5 TABLET ORAL at 11:23

## 2021-09-10 RX ADMIN — ONDANSETRON 4 MG: 4 TABLET, ORALLY DISINTEGRATING ORAL at 11:23

## 2021-09-10 RX ADMIN — CEPHALEXIN 500 MG: 500 CAPSULE ORAL at 17:13

## 2021-09-10 RX ADMIN — VENLAFAXINE HYDROCHLORIDE 150 MG: 150 CAPSULE, EXTENDED RELEASE ORAL at 17:13

## 2021-09-10 RX ADMIN — SODIUM CHLORIDE 75 ML/HR: 9 INJECTION, SOLUTION INTRAVENOUS at 11:23

## 2021-09-10 RX ADMIN — PREDNISONE 40 MG: 20 TABLET ORAL at 08:27

## 2021-09-10 RX ADMIN — CEFTRIAXONE SODIUM 1 G: 1 INJECTION, POWDER, FOR SOLUTION INTRAMUSCULAR; INTRAVENOUS at 14:18

## 2021-09-10 NOTE — PROGRESS NOTES
Hospital follow-up PCP transitional care appointment has been scheduled with Dr. Varun Vazquez for Monday, 9/20/21 at 11:45 a.m. This is the earliest appointment available. Pending patient discharge.   Ashley Felipe, Care Management Specialist.

## 2021-09-10 NOTE — DISCHARGE INSTRUCTIONS
[facility]/ED Visit Follow-Up Instructions/Information    You may have an in home follow up visit set up with [facility] or may wish to contact [facility] to set-up a visit:    What are we? [facility] is an in-home urgent care service staffed with emergency trained medical teams. We come to your home in a vehicle stocked with medical supplies and technology. An ER physician is always available if needed. When? As a part of your hospital follow-up, an appointment has been/ or can be set up for us to come see you. Usually, this will be 24-72 hours after you leave the hospital or as needed. [facility] is open 7am-9pm, 7 days a week, 365 days a year, including holidays. Why? We know that you cannot always get to your doctor after being in the hospital and that your doctor is not always available when you need them. Once your workup is complete, we'll call in your prescriptions, update your family doctor, and handle billing with your insurance so you can focus on feeling better, faster without leaving home. How much? We accept most major health insurance plans, including Medicaid, Medicare, and Medicare Advantage [facility], [facility], [employer], and my[employer]. We also accept: credit, debit, health savings account (HSA), health reimbursement account (HRA) and flexible spending account (FSA) payments. [facility]'s prices compare to conventional urgent care facilities, but we bring the care to you. How to reach us? Getting care is easy- use our mobile alison ([facility]), website ([email or web address]) or call us [phone].           Discharge Instructions       PATIENT ID: Sherlyn Handy  MRN: [id number]   YOB: 1995    DATE OF ADMISSION: 9/7/2021  6:39 PM    DATE OF DISCHARGE: 9/10/2021    PRIMARY CARE PROVIDER: Chantale Meza MD     ATTENDING PHYSICIAN: Conrel Harris MD  DISCHARGING PROVIDER: SEEMA Rodriguez    To contact this individual call [phone] and ask the  to page. If unavailable ask to be transferred the Adult Hospitalist Department. DISCHARGE DIAGNOSES Acute Hypoxia, RSV, Acute Bronchitis    CONSULTATIONS: IP CONSULT TO PULMONOLOGY    PROCEDURES/SURGERIES: * No surgery found *    PENDING TEST RESULTS:   At the time of discharge the following test results are still pending: None    FOLLOW UP APPOINTMENTS:   Follow-up Information     Follow up With Specialties Details Why Contact Jessica Sutherland MD Infectious Disease   [address] [place] [address]  [phone]             ADDITIONAL CARE RECOMMENDATIONS:   Please make sure to follow up with your primary care physician within 1-2 weeks of discharge for hospital follow up. You should also follow up with your infectious disease doctor and pulmonalogy.   - Please make sure to continue to monitor for: Chest pain, shortness of breath, high fevers, or sudden weakness or numbness and return to the Emergency Department with any of these symptoms.   - Avoid tobacco, alcohol and other illicit drug use. - Diet restrictions: None   - Activity restrictions: none         DIET: Regular  Oral Nutritional Supplements: No Oral Supplement prescribed None     ACTIVITY: As tolerated    WOUND CARE: None    EQUIPMENT needed: None      DISCHARGE MEDICATIONS:   See Medication Reconciliation Form    · It is important that you take the medication exactly as they are prescribed. · Keep your medication in the bottles provided by the pharmacist and keep a list of the medication names, dosages, and times to be taken in your wallet. · Do not take other medications without consulting your doctor. NOTIFY YOUR PHYSICIAN FOR ANY OF THE FOLLOWING:   Fever over 101 degrees for 24 hours. Chest pain, shortness of breath, fever, chills, nausea, vomiting, diarrhea, change in mentation, falling, weakness, bleeding.  Severe pain or pain not relieved by medications. Or, any other signs or symptoms that you may have questions about.       DISPOSITION:  X  Home With:   OT  PT  HH  RN       SNF/Inpatient Rehab/LTAC    Independent/assisted living    Hospice    Other:     CDMP Checked:   Yes ***     PROBLEM LIST Updated:  Yes ***       Signed:   SEEMA Stapleton  9/10/2021  4:22 PM

## 2021-09-10 NOTE — PROGRESS NOTES
I have reviewed discharge instructions with the patient to include 2 prescriptions that the pt took with her to fill at [facility] pharmacy for Keflex and Prednisone. F/U appt scheduled and pt made aware. to also f/u with ID and pulmonary . The patient verbalized understanding of all DC instructions.

## 2021-09-10 NOTE — DISCHARGE SUMMARY
Discharge Summary       PATIENT ID: Jewel Dubon  MRN: [id number]   YOB: 1995    DATE OF ADMISSION: 9/7/2021  6:39 PM    DATE OF DISCHARGE: 9/10/2021   PRIMARY CARE PROVIDER: Vernon Sutherland MD     ATTENDING PHYSICIAN: Erica Esparza MD  DISCHARGING PROVIDER: SEEMA Scanlon    To contact this individual call [phone] and ask the  to page. If unavailable ask to be transferred the Adult Hospitalist Department. CONSULTATIONS: IP CONSULT TO PULMONOLOGY    PROCEDURES/SURGERIES: * No surgery found *    [id number] COURSE:   31 y/o female presented on 9/9/21 with shortness of breath. Patient reports 5 day hx of SOB, cough and body aches. Patient noted to be hypoxic and tachycardic on admission. Patient admitted with acute bronchitis, RSV positive, hypoxia. Covid negative. Treated with bronchodilators, short course of steroids and empiric antibiotics. DISCHARGE DIAGNOSES / PLAN:      # Acute hypoxemic respiratory failure, RSV +     - Prednisone 40 mg X 5 days, will complete course at discharge     - Continue prn albuterol inhaler     - Continue supportive care  # Acute bronchitis     - Outpatient follow-up for PFTs  # Leukocytosis     - Keflex 500 mg X 3days                  PENDING TEST RESULTS:   At the time of discharge the following test results are still pending: None    FOLLOW UP APPOINTMENTS:    Follow-up Information     Follow up With Specialties Details Why Contact Info    Vernon Sutherland MD Infectious Disease   [address]  [phone]             ADDITIONAL CARE RECOMMENDATIONS:   Please make sure to follow up with your primary care physician within 1-2 weeks of discharge for hospital follow up.  You should also follow up with your infectious disease doctor and pulmonalogy.   - Please make sure to continue to monitor for: Chest pain, shortness of breath, high fevers, or sudden weakness or numbness and return to the Emergency Department with any of these symptoms.   - Avoid tobacco, alcohol and other illicit drug use. - Diet restrictions: None   - Activity restrictions: none       DIET: regular  Oral Nutritional Supplements: No Oral Supplement prescribedNone    ACTIVITY: As tolerated    WOUND CARE: None    EQUIPMENT needed: None      DISCHARGE MEDICATIONS:  Current Discharge Medication List      START taking these medications    Details   cephALEXin (KEFLEX) 500 mg capsule Take 1 Capsule by mouth three (3) times daily for 9 doses. Qty: 9 Capsule, Refills: 0  Start date: 9/10/2021, End date: 9/13/2021      !! predniSONE (DELTASONE) 20 mg tablet Take 40 mg by mouth daily (with breakfast) for 2 days. Qty: 4 Tablet, Refills: 0  Start date: 9/10/2021, End date: 9/12/2021      !! predniSONE (DELTASONE) 20 mg tablet Take 20 mg by mouth daily (with breakfast) for 1 day. Qty: 1 Tablet, Refills: 0  Start date: 9/13/2021, End date: 9/14/2021       !! - Potential duplicate medications found. Please discuss with provider. CONTINUE these medications which have NOT CHANGED    Details   venlafaxine-SR (EFFEXOR-XR) 75 mg capsule Take 2 Capsules by mouth daily. Increase from 75mg daily dose. Qty: 180 Capsule, Refills: 1    Associated Diagnoses: Anxiety and depression      albuterol (PROVENTIL HFA, VENTOLIN HFA, PROAIR HFA) 90 mcg/actuation inhaler Take 2 Puffs by inhalation every four (4) hours as needed for Wheezing. Qty: 1 Inhaler, Refills: 0    Associated Diagnoses: Wheezy bronchitis; Mild shortness of breath               NOTIFY YOUR PHYSICIAN FOR ANY OF THE FOLLOWING:   Fever over 101 degrees for 24 hours. Chest pain, shortness of breath, fever, chills, nausea, vomiting, diarrhea, change in mentation, falling, weakness, bleeding. Severe pain or pain not relieved by medications. Or, any other signs or symptoms that you may have questions about.     DISPOSITION:  X  Home With:   OT  PT  [facility]  RN Long term SNF/Inpatient Rehab    Independent/assisted living    Hospice    Other:       PATIENT CONDITION AT DISCHARGE:     Functional status    Poor     Deconditioned    X Independent      Cognition   X  Lucid     Forgetful     Dementia      Catheters/lines (plus indication)    [place]     PICC     PEG    X None      Code status    X Full code     DNR      PHYSICAL EXAMINATION AT DISCHARGE  Constitutional:  No acute distress, cooperative, pleasant    ENT:  Oral mucosa moist, oropharynx benign. Resp:  CTA bilaterally. + wheezing/rhonchi/rales. No accessory muscle use   CV:  Regular rhythm, normal rate, no murmurs, S1, S2    GI:  Soft, non distended, non tender. normoactive bowel sounds    Musculoskeletal:  No edema, warm, 2+ pulses throughout    Neurologic:  Moves all extremities.   AAOx3, CN II-XII reviewed           CHRONIC MEDICAL DIAGNOSES:  Problem List as of 9/10/2021 Date Reviewed: 6/7/2021        Codes Class Noted - Resolved    Acute respiratory failure with hypoxia [facility]) ICD-10-CM: J96.01  ICD-9-CM: 518.81  9/7/2021 - Present              Greater than 30 minutes were spent with the patient on counseling and coordination of care    Signed:   SEEMA Coleman  9/10/2021  3:57 PM

## 2021-09-12 LAB
BACTERIA SPEC CULT: NORMAL
SERVICE CMNT-IMP: NORMAL

## 2021-09-13 LAB
BACTERIA SPEC CULT: NORMAL
SERVICE CMNT-IMP: NORMAL

## 2021-09-20 ENCOUNTER — OFFICE VISIT (OUTPATIENT)
Dept: INTERNAL MEDICINE CLINIC | Age: 26
End: 2021-09-20
Payer: COMMERCIAL

## 2021-09-20 VITALS
HEIGHT: 63 IN | HEART RATE: 90 BPM | SYSTOLIC BLOOD PRESSURE: 126 MMHG | OXYGEN SATURATION: 98 % | TEMPERATURE: 98.3 F | BODY MASS INDEX: 30.87 KG/M2 | RESPIRATION RATE: 16 BRPM | DIASTOLIC BLOOD PRESSURE: 76 MMHG | WEIGHT: 174.2 LBS

## 2021-09-20 DIAGNOSIS — N60.01 BREAST CYST, RIGHT: ICD-10-CM

## 2021-09-20 DIAGNOSIS — Z20.2 EXPOSURE TO CHLAMYDIA: ICD-10-CM

## 2021-09-20 DIAGNOSIS — Z09 HOSPITAL DISCHARGE FOLLOW-UP: ICD-10-CM

## 2021-09-20 DIAGNOSIS — R79.89 ELEVATED LFTS: ICD-10-CM

## 2021-09-20 DIAGNOSIS — J12.1 RSV (RESPIRATORY SYNCYTIAL VIRUS PNEUMONIA): Primary | ICD-10-CM

## 2021-09-20 DIAGNOSIS — Z11.3 ROUTINE SCREENING FOR STI (SEXUALLY TRANSMITTED INFECTION): ICD-10-CM

## 2021-09-20 DIAGNOSIS — Z23 ENCOUNTER FOR IMMUNIZATION: ICD-10-CM

## 2021-09-20 DIAGNOSIS — D72.10 EOSINOPHILIA, UNSPECIFIED TYPE: ICD-10-CM

## 2021-09-20 PROCEDURE — 90686 IIV4 VACC NO PRSV 0.5 ML IM: CPT | Performed by: INTERNAL MEDICINE

## 2021-09-20 PROCEDURE — 90471 IMMUNIZATION ADMIN: CPT | Performed by: INTERNAL MEDICINE

## 2021-09-20 PROCEDURE — 99215 OFFICE O/P EST HI 40 MIN: CPT | Performed by: INTERNAL MEDICINE

## 2021-09-20 NOTE — PROGRESS NOTES
Jose Lima (: 1995) is a 32 y.o. female, established patient, here for evaluation of the following chief complaint(s):  Chief Complaint   Patient presents with   [facility] Follow-up     hospital admit -9/10 acute resp failure       Assessment and Plan:       ICD-10-CM ICD-9-CM    1. RSV (respiratory syncytial virus pneumonia)  J12.1 480.1    2. Hospital discharge follow-up  Z09 V67.59 CBC WITH AUTOMATED DIFF      METABOLIC PANEL, COMPREHENSIVE      METABOLIC PANEL, COMPREHENSIVE      CBC WITH AUTOMATED DIFF      REFERRAL TO PULMONARY DISEASE   3. Routine screening for STI (sexually transmitted infection)  Z11.3 V74.5 CT/NG/T.VAGINALIS AMPLIFICATION      CT/NG/T.VAGINALIS AMPLIFICATION   4. Exposure to chlamydia  Z20.2 V01.6 CT/NG/T.VAGINALIS AMPLIFICATION      CT/NG/T.VAGINALIS AMPLIFICATION   5. Breast cyst, right--incidental finding on CT chest 21. N60.01 610.0    6. Encounter for immunization  Z23 V03.89 INFLUENZA VIRUS VAC QUAD,SPLIT,PRESV FREE SYRINGE IM       1,2. Reviewed hospitalization and labs. Monitoring labs reviewed at visit. 3,4:  Screening reviewed. 5.  Incidental CT finding and follow-up reviewed. 6.  Immunization(s) reviewed and updated at visit. Follow-up and Dispositions    · Return if symptoms worsen or fail to improve.       lab results and schedule of future lab studies reviewed with patient  reviewed medications and side effects in detail    For additional documentation of information and/or recommendations discussed this visit, please see notes in instructions. Plan and evaluation (above) reviewed with pt at visit  Patient voiced understanding of plan and provided with time to ask/review questions. After Visit Summary (AVS) provided to pt after visit with additional instructions as needed/reviewed. No future appointments. --Updated future visits after patient check-out.       On this date 2021 I have spent 44 minutes reviewing previous notes, test results and face to face with the patient discussing the diagnosis and importance of compliance with the treatment plan as well as documenting on the day of the visit. History of Present Illness:     Notes (nursing/rooming note copied below in italics):  As above    Admit to [facility] on 9/7 with dyspnea x 5 days. Discharged 9/10/21. She was tachycardic and hypoxic on admit--admit dx acute brochitis, RSV (+), COVID negative. She was discharged on prednisong 40mg x 5 days--to complete course. Albuterol PRN. Planned pulmonary follow-up for PFT's and completion Keflex outpt for elev WBC. At discharge, had 2 days prednisone and 3 days cephalexin remaining. Respiratory PCR on 9/8 negative for COVID and only positive for RSV. She had elevated WBC with primarily neutrophils. Procalcitonin negative  Normal tropinin. Mild elevation BNP  Blood culture NGTD x 5 days. Imaging on 9/7 with CXR with no acute process. Imaging on 9/10 with CXR with no acute disease. CT chest without pulmonary emboli on 9/7 but GGO in RUL. No adenopathy of mass. There was a 1cm nodule in right breast c/w cyst or fibroadenoma--recommended US. Incidental finding--pt not aware. She is planning to do PFT's with pulmonary follow-up. Saw Dr. Alicia Majano inpt. Referral reviewed. Plan defer TTE until seen by pulmonary--reviewed. Troponins negative and mild elevation BNP to 175 (with ). She notes agreeable with this plan. She had yeast infection she self-treated with OTC product with antibiotics above--resolved. Nursing screenings reviewed by provider at visit. Past Medical History:   Diagnosis Date    Headache      Past Surgical History:   Procedure Laterality Date    HX GYN  08/2016    Kulwant Miranda        Prior to Admission medications    Medication Sig Start Date End Date Taking? Authorizing Provider   venlafaxine-SR [facility] 75 mg capsule Take 2 Capsules by mouth daily.  Increase from 75mg daily dose. 7/12/21   Andres Agrawal MD   albuterol (PROVENTIL HFA, VENTOLIN HFA, PROAIR HFA) 90 mcg/actuation inhaler Take 2 Puffs by inhalation every four (4) hours as needed for Wheezing. 6/7/21   TRIPP Prakash    Vitals:    09/20/21 1153   BP: 126/76   Pulse: 90   Resp: 16   Temp: 98.3 °F (36.8 °C)   TempSrc: Oral   SpO2: 98%   Weight: 174 lb 3.2 oz (79 kg)   Height: 5' 3\" (1.6 m)   PainSc:   0 - No pain   LMP: 08/26/2021      There is no height or weight on file to calculate BMI. Physical Exam:     Physical Exam  Vitals and nursing note reviewed. Constitutional:       General: She is not in acute distress. Appearance: Normal appearance. She is well-developed. She is not diaphoretic. HENT:      Head: Normocephalic and atraumatic. Eyes:      General: No scleral icterus. Right eye: No discharge. Left eye: No discharge. Conjunctiva/sclera: Conjunctivae normal.   Cardiovascular:      Rate and Rhythm: Normal rate and regular rhythm. Pulses: Normal pulses. Heart sounds: Normal heart sounds. No murmur heard. No friction rub. No gallop. Pulmonary:      Effort: Pulmonary effort is normal. No respiratory distress. Breath sounds: Normal breath sounds. No stridor. No wheezing, rhonchi or rales. Abdominal:      General: Bowel sounds are normal. There is no distension. Palpations: Abdomen is soft. Tenderness: There is no abdominal tenderness. There is no guarding. Musculoskeletal:         General: No swelling, tenderness, deformity or signs of injury. Cervical back: Neck supple. No rigidity. No muscular tenderness. Right lower leg: No edema. Left lower leg: No edema. Lymphadenopathy:      Cervical: No cervical adenopathy. Skin:     General: Skin is warm. Coloration: Skin is not jaundiced or pale. Findings: No bruising, erythema or rash. Neurological:      General: No focal deficit present. Mental Status: She is alert. Motor: No abnormal muscle tone. Coordination: Coordination normal.      Gait: Gait normal.   Psychiatric:         Mood and Affect: Mood normal.         Behavior: Behavior normal.         Thought Content: Thought content normal.         Judgment: Judgment normal.       Breast exam:  Breast exam with no masses palpated bilaterally. No skin changes or nipple discharge bilaterally. No axillary lymphadenopathy noted bilaterally. No distinct mass palpated on exam bilat. Fibrocystic changes noted bilat. Reviewed with pt at visit. Nurse chaperone (TW)  present for breast exam.      An electronic signature was used to authenticate this note.   -- Shemar Hinojosa MD

## 2021-09-20 NOTE — PROGRESS NOTES
Rm 17  Recent Travel Screening and Travel History documentation     Travel Screening     Question   Response    In the last month, have you been in contact with someone who was confirmed or suspected to have Coronavirus / COVID-19? No / Unsure    Have you had a COVID-19 viral test in the last 14 days? No    Do you have any of the following new or worsening symptoms? None of these    Have you traveled internationally or domestically in the last month? No      Travel History   Travel since 08/20/21     No documented travel since 08/20/21          Chief Complaint   Patient presents with   [facility] ED Follow-up     hospital admit 9/7-9/10 acute resp failure         1. Have you been to the ER, urgent care clinic since your last visit? Hospitalized since your last visit? ED, 9/7/21-9/10/21, acute respiratory failure    2. Have you seen or consulted any other health care providers outside of the [address] [place] [address] since your last visit? Include any pap smears or colon screening.  No        Health Maintenance Due   Topic Date Due    Hepatitis C Screening  Never done    HPV Age 9Y-34Y (1 - 2-dose series) Never done    COVID-19 Vaccine (1) Never done    DTaP/Tdap/Td series (1 - Tdap) Never done    Pap Smear  Never done    Flu Vaccine (1) 09/01/2021           3 most recent PHQ Screens 9/20/2021   Little interest or pleasure in doing things Not at all   Feeling down, depressed, irritable, or hopeless Not at all   Total Score PHQ 2 0   Trouble falling or staying asleep, or sleeping too much -   Feeling tired or having little energy -   Poor appetite, weight loss, or overeating -   Feeling bad about yourself - or that you are a failure or have let yourself or your family down -   Trouble concentrating on things such as school, work, reading, or watching TV -   Moving or speaking so slowly that other people could have noticed; or the opposite being so fidgety that others notice -   Thoughts of being better off dead, or hurting yourself in some way -   PHQ 9 Score -   How difficult have these problems made it for you to do your work, take care of your home and get along with others -           Learning Assessment 1/8/2021   PRIMARY LEARNER Patient   PRIMARY LANGUAGE ENGLISH   LEARNER PREFERENCE PRIMARY LISTENING   ANSWERED BY patient   RELATIONSHIP SELF         An After Visit Summary was printed and given to the patient.

## 2021-09-20 NOTE — PROGRESS NOTES
After obtaining consent, and per orders of Dr. Filemon Melo, injection of Flu vaccine given by Sandra Swann. Patient instructed to remain in clinic for 20 minutes afterwards, and to report any adverse reaction to me immediately. Patient tolerated well. No reaction observed. AVS and Flu VIS given to patient.

## 2021-09-22 LAB
ALBUMIN SERPL-MCNC: 4.3 G/DL (ref 3.9–5)
ALBUMIN/GLOB SERPL: 1.9 {RATIO} (ref 1.2–2.2)
ALP SERPL-CCNC: 110 IU/L (ref 44–121)
ALT SERPL-CCNC: 101 IU/L (ref 0–32)
AST SERPL-CCNC: 44 IU/L (ref 0–40)
BASOPHILS # BLD AUTO: 0.1 X10E3/UL (ref 0–0.2)
BASOPHILS NFR BLD AUTO: 1 %
BILIRUB SERPL-MCNC: 0.4 MG/DL (ref 0–1.2)
BUN SERPL-MCNC: 12 MG/DL (ref 6–20)
BUN/CREAT SERPL: 19 (ref 9–23)
C TRACH RRNA SPEC QL NAA+PROBE: NEGATIVE
CALCIUM SERPL-MCNC: 9.3 MG/DL (ref 8.7–10.2)
CHLORIDE SERPL-SCNC: 104 MMOL/L (ref 96–106)
CO2 SERPL-SCNC: 25 MMOL/L (ref 20–29)
CREAT SERPL-MCNC: 0.62 MG/DL (ref 0.57–1)
EOSINOPHIL # BLD AUTO: 1.6 X10E3/UL (ref 0–0.4)
EOSINOPHIL NFR BLD AUTO: 17 %
ERYTHROCYTE [DISTWIDTH] IN BLOOD BY AUTOMATED COUNT: 13.1 % (ref 11.7–15.4)
GLOBULIN SER CALC-MCNC: 2.3 G/DL (ref 1.5–4.5)
GLUCOSE SERPL-MCNC: 112 MG/DL (ref 65–99)
HCT VFR BLD AUTO: 39.8 % (ref 34–46.6)
HGB BLD-MCNC: 13.2 G/DL (ref 11.1–15.9)
IMM GRANULOCYTES # BLD AUTO: 0.1 X10E3/UL (ref 0–0.1)
IMM GRANULOCYTES NFR BLD AUTO: 1 %
LYMPHOCYTES # BLD AUTO: 2 X10E3/UL (ref 0.7–3.1)
LYMPHOCYTES NFR BLD AUTO: 21 %
MCH RBC QN AUTO: 27.6 PG (ref 26.6–33)
MCHC RBC AUTO-ENTMCNC: 33.2 G/DL (ref 31.5–35.7)
MCV RBC AUTO: 83 FL (ref 79–97)
MONOCYTES # BLD AUTO: 0.8 X10E3/UL (ref 0.1–0.9)
MONOCYTES NFR BLD AUTO: 8 %
N GONORRHOEA RRNA SPEC QL NAA+PROBE: NEGATIVE
NEUTROPHILS # BLD AUTO: 5 X10E3/UL (ref 1.4–7)
NEUTROPHILS NFR BLD AUTO: 52 %
PLATELET # BLD AUTO: 285 X10E3/UL (ref 150–450)
POTASSIUM SERPL-SCNC: 4 MMOL/L (ref 3.5–5.2)
PROT SERPL-MCNC: 6.6 G/DL (ref 6–8.5)
RBC # BLD AUTO: 4.78 X10E6/UL (ref 3.77–5.28)
SODIUM SERPL-SCNC: 141 MMOL/L (ref 134–144)
T VAGINALIS DNA SPEC QL NAA+PROBE: NEGATIVE
WBC # BLD AUTO: 9.6 X10E3/UL (ref 3.4–10.8)

## 2021-09-24 ENCOUNTER — HOSPITAL ENCOUNTER (OUTPATIENT)
Dept: MAMMOGRAPHY | Age: 26
Discharge: HOME | End: 2021-09-24
Attending: INTERNAL MEDICINE
Payer: COMMERCIAL

## 2021-09-24 DIAGNOSIS — N60.01 BREAST CYST, RIGHT: ICD-10-CM

## 2021-09-24 PROCEDURE — 76642 ULTRASOUND BREAST LIMITED: CPT

## 2021-11-22 ENCOUNTER — VIRTUAL VISIT (OUTPATIENT)
Dept: INTERNAL MEDICINE CLINIC | Age: 26
End: 2021-11-22
Payer: COMMERCIAL

## 2021-11-22 DIAGNOSIS — F32.A ANXIETY AND DEPRESSION: Primary | ICD-10-CM

## 2021-11-22 DIAGNOSIS — F41.9 ANXIETY AND DEPRESSION: Primary | ICD-10-CM

## 2021-11-22 DIAGNOSIS — Z71.85 IMMUNIZATION COUNSELING: ICD-10-CM

## 2021-11-22 PROCEDURE — 99214 OFFICE O/P EST MOD 30 MIN: CPT | Performed by: INTERNAL MEDICINE

## 2021-11-22 RX ORDER — BUPROPION HYDROCHLORIDE 150 MG/1
150 TABLET ORAL DAILY
Qty: 30 TABLET | Refills: 0 | Status: SHIPPED | OUTPATIENT
Start: 2021-11-22 | End: 2021-12-13 | Stop reason: SDUPTHER

## 2021-11-22 RX ORDER — MONTELUKAST SODIUM 10 MG/1
TABLET ORAL
COMMUNITY
Start: 2021-11-16

## 2021-11-22 RX ORDER — VENLAFAXINE HYDROCHLORIDE 150 MG/1
150 CAPSULE, EXTENDED RELEASE ORAL DAILY
Qty: 30 CAPSULE | Refills: 5 | Status: SHIPPED | OUTPATIENT
Start: 2021-11-22 | End: 2022-04-28 | Stop reason: SDUPTHER

## 2021-11-22 RX ORDER — FLUTICASONE FUROATE AND VILANTEROL TRIFENATATE 200; 25 UG/1; UG/1
POWDER RESPIRATORY (INHALATION)
COMMUNITY
Start: 2021-11-16

## 2021-11-22 NOTE — PROGRESS NOTES
Mague Rivera (: 1995) is a 32 y.o. female, established patient, here for evaluation of the following chief complaint(s)--see below:    Mague Rivera is a 32 y.o. female who was seen by synchronous (real-time) audio-video technology on 2021. Consent: Mague Rivera, who was seen by synchronous (real-time) audio-video technology, and/or her healthcare decision maker, is aware that this patient-initiated, Telehealth encounter on 2021 is a billable service, with coverage as determined by her insurance carrier. She is aware that she may receive a bill and has provided verbal consent to proceed: Yes. I was in the office while conducting this encounter. Assessment & Plan:   Diagnoses and all orders for this visit:      ICD-10-CM ICD-9-CM    1. Anxiety and depression  F41.9 300.00 venlafaxine-SR (EFFEXOR-XR) 150 mg capsule    F32. A 311 DISCONTINUED: buPROPion XL (WELLBUTRIN XL) 150 mg tablet   2. Immunization counseling  Z71.85 V65.49        1. Refill as single capsule reviewed at visit. 2.  Questions reviewed. Follow-up and Dispositions    · Return in about 4 weeks (around 2021) for medication follow-up--4-6 weeks. reviewed medications and side effects in detail    Plan and evaluation (above) reviewed with pt at visit  Patient voiced understanding of plan and provided with time to ask/review questions. After Visit Summary (AVS) provided to pt after visit with additional instructions as needed/reviewed. AVS:  [x]  Available to patient in [employer] [employer] after visit signed. []  Mailed to patient after visit. []  Not sent to patient after visit. No future appointments. --Updated future visits after patient check-out. Subjective:   Mague Rivera was seen for:  Chief Complaint   Patient presents with    Discuss Medications     meds/covid vaccine         Notes:  She thinks her Venlafaxine has helped with her anxiety.   She is already on the 150mg dose as 2 x 75mg daily. She would prefer to continue the Venlafaxine, but still has low motivation. She has some depressed mood sympotms but anxety well cotnrolled. Nursing screenings reviewed by provider at visit. Allergies   Allergen Reactions    Amitriptyline Other (comments)     Tinnitus. And not effective for sleep or headache mgt.  Doxycycline Rash    Erythromycin Rash    Lunesta [Eszopiclone] Other (comments)     Bad taste and not effective for sleep mgt. Prior to Admission medications    Medication Sig Start Date End Date Taking? Authorizing Provider   venlafaxine-SR [facility] 75 mg capsule Take 2 Capsules by mouth daily. Increase from 75mg daily dose. 7/12/21  Yes Elizabeth Ford MD   albuterol (PROVENTIL HFA, VENTOLIN HFA, PROAIR HFA) 90 mcg/actuation inhaler Take 2 Puffs by inhalation every four (4) hours as needed for Wheezing. 6/7/21  Yes Kelly Amador NP   Breo Ellipta 200-25 mcg/dose inhaler  11/16/21   Provider, Historical   montelukast (SINGULAIR) 10 mg tablet  11/16/21   Provider, Historical         ROS    PHYSICAL EXAMINATION:    Vital Signs: There were no vitals taken for this visit. No flowsheet data found.      Constitutional: [x] Appears well-developed and well-nourished [x] No apparent distress      Mental status: [x] Alert and awake  [x] Oriented [x] Able to follow commands       Eyes:   EOM    [x]  Normal      Sclera  [x]  Normal              Discharge [x]  None visible       HENT: [x] Normocephalic, atraumatic    [x] Mouth/Throat: Mucous membranes are moist    External Ears [x] Normal      Neck: [x] No visualized mass     Pulmonary/Chest: [x] Respiratory effort normal   [x] No visualized signs of difficulty breathing or respiratory distress    Musculoskeletal:  [x] Normal range of motion of neck    Neurological:        [x] No Facial Asymmetry (Cranial nerve 7 motor function) (limited exam due to video visit)          [x] No gaze palsy     Skin: [x] No significant exanthematous lesions or discoloration noted on facial skin             Psychiatric:       [x] Normal Affect       Other pertinent observable physical exam findings:  None. We discussed the expected course, resolution and complications of the diagnosis(es) in detail. Medication risks, benefits, costs, interactions, and alternatives were discussed as indicated. I advised her to contact the office if her condition worsens, changes or fails to improve as anticipated. She expressed understanding with the diagnosis(es) and plan. Kian Aranda is a 32 y.o. female who was evaluated by a video visit encounter for concerns as above. Kian Aranda is being evaluated by a Virtual Visit (video visit) encounter to address concerns as mentioned above. A caregiver was present when appropriate. Due to this being a TeleHealth encounter (During BEKBO-09 public health emergency), evaluation of the following organ systems was limited: Vitals/Constitutional/EENT/Resp/CV/GI//MS/Neuro/Skin/Heme-Lymph-Imm. Pursuant to the emergency declaration under the [address] [place] [address] and the [employer] and [employer] Act, this Virtual Visit was conducted with patient's (and/or legal guardian's) consent, to reduce the patient's risk of exposure to COVID-19 and provide necessary medical care. The patient (and/or legal guardian) has also been advised to contact this office for worsening conditions or problems, and seek emergency medical treatment and/or call 911 if deemed necessary. Patient identification was verified at the start of the visit: YES. Services were provided through a video synchronous discussion virtually to substitute for in-person clinic visit. Patient was located at their individual home (or other location as per patient preference). Provider was located in medical office.     An electronic signature was used to authenticate this note.   -- Tamiko Ricks MD

## 2021-11-22 NOTE — PROGRESS NOTES
[phone]    Chief Complaint   Patient presents with    Discuss Medications     meds/covid vaccine     1. Have you been to the ER, urgent care clinic since your last visit? Hospitalized since your last visit? No    2. Have you seen or consulted any other health care providers outside of the [address] [place] [address] since your last visit? Include any pap smears or colon screening.  No

## 2021-12-13 ENCOUNTER — OFFICE VISIT (OUTPATIENT)
Dept: INTERNAL MEDICINE CLINIC | Age: 26
End: 2021-12-13
Payer: COMMERCIAL

## 2021-12-13 VITALS
WEIGHT: 175 LBS | TEMPERATURE: 98 F | HEART RATE: 89 BPM | BODY MASS INDEX: 31.01 KG/M2 | SYSTOLIC BLOOD PRESSURE: 116 MMHG | HEIGHT: 63 IN | RESPIRATION RATE: 16 BRPM | DIASTOLIC BLOOD PRESSURE: 75 MMHG | OXYGEN SATURATION: 99 %

## 2021-12-13 DIAGNOSIS — F32.A ANXIETY AND DEPRESSION: ICD-10-CM

## 2021-12-13 DIAGNOSIS — R20.9 BILATERAL COLD FEET: ICD-10-CM

## 2021-12-13 DIAGNOSIS — K62.5 RECTAL BLEEDING: Primary | ICD-10-CM

## 2021-12-13 DIAGNOSIS — F41.9 ANXIETY AND DEPRESSION: ICD-10-CM

## 2021-12-13 PROCEDURE — 99214 OFFICE O/P EST MOD 30 MIN: CPT | Performed by: INTERNAL MEDICINE

## 2021-12-13 RX ORDER — BUPROPION HYDROCHLORIDE 150 MG/1
150 TABLET ORAL DAILY
Qty: 90 TABLET | Refills: 1 | Status: SHIPPED | OUTPATIENT
Start: 2021-12-13 | End: 2022-04-28 | Stop reason: SDUPTHER

## 2021-12-13 NOTE — PROGRESS NOTES
rm 16    Chief Complaint   Patient presents with    Rectal Bleeding     1. Have you been to the ER, urgent care clinic since your last visit? Hospitalized since your last visit? No    2. Have you seen or consulted any other health care providers outside of the [address] [place] [address] since your last visit? Include any pap smears or colon screening.  No       Visit Vitals  /75   Pulse 89   Temp 98 °F (36.7 °C) (Oral)   Resp 16   Ht 5' 3\" (1.6 m)   Wt 175 lb (79.4 kg)   SpO2 99%   BMI 31.00 kg/m²

## 2021-12-13 NOTE — PATIENT INSTRUCTIONS
1.  If needed, you can contact:    [facility] at [phone]. 2.  Please follow the following instructions to process/authorize your referral, if needed:    Referrals processing  Please verify with your insurance IF you need referral authorization submitted. For insurance plans which require this, please follow the following steps. FAILURE TO DO SO MAY RESULT IN INABILITY TO SEE THE SPECIALIST YOU HAVE BEEN REFERRED TO (once you are scheduled to see them). 1. Call and schedule appointment with specialist  2. Call our clinic and leave message with provider name, and date of appointment  3. We will then submit the referral to your insurance. This process takes 2-5 business days. If you have questions about scheduling or authorizing referral, you can review with our referral coordinator. You can review with her today if available/if you have time, or you can call to review once you have made your referral/appointment. If you are not sure if you need referral authorizations, please review with the referral coordinator(s), either prior to or after you have made the appointment, as reviewed.

## 2021-12-13 NOTE — PROGRESS NOTES
Minal Pillai (: 1995) is a 32 y.o. female, established patient, here for evaluation of the following chief complaint(s):  Chief Complaint   Patient presents with    Rectal Bleeding       Assessment and Plan:       ICD-10-CM ICD-9-CM    1. Rectal bleeding  K62.5 569.3 REFERRAL TO COLON AND RECTAL SURGERY      CBC WITH AUTOMATED DIFF      METABOLIC PANEL, COMPREHENSIVE   2. Anxiety and depression  F41.9 300.00 buPROPion XL (WELLBUTRIN XL) 150 mg tablet    F32. A 311    3. Bilateral cold feet  R20.9 782.9 DUPLEX LOWER EXT ARTERY BILAT      REFERRAL TO VASCULAR SURGERY      CBC WITH AUTOMATED DIFF      METABOLIC PANEL, COMPREHENSIVE      HEMOGLOBIN A1C WITH EAG      VITAMIN D, 25 HYDROXY      VITAMIN B12      FOLATE      TSH 3RD GENERATION      T4, FREE      C REACTIVE PROTEIN, QT      SED RATE (ESR)      FERRITIN      IRON PROFILE       1. Referral(s) and referral coordination reviewed with patient at visit. Lab monitoring reviewed. 2.  Refill/continuation Wellbutrin reviewed. 3.  Referral(s) and referral coordination reviewed with patient at visit. Lab monitoring reviewed. Eval for neuropathy reviewed. Consider eval with rheum for possible Reynaud's d/w pt at visit. Follow-up and Dispositions    · Return in about 3 months (around 3/13/2022), or if symptoms worsen or fail to improve, for medication follow-up.       lab results and schedule of future lab studies reviewed with patient  reviewed medications and side effects in detail    For additional documentation of information and/or recommendations discussed this visit, please see notes in instructions. Plan and evaluation (above) reviewed with pt at visit  Patient voiced understanding of plan and provided with time to ask/review questions. After Visit Summary (AVS) provided to pt after visit with additional instructions as needed/reviewed. No future appointments. --Updated future visits after patient check-out.       History of Present Illness:     Notes (nursing/rooming note copied below in italics):  As above    Seen for VV for anxiety and depression 11/22 and added bupropion to her current venlafaxine. Refill reviewed today with pt at visit. Rectal bleeding:  She notes had had hemorrhoidal bleeding in past, but smaller amounts of blood. She has bleeding in toilet, and will fill toilet. She has had 3-4 times in past 1 week--more recent yesterday. She has not had management for her hemorrhoids in past, and just used OTC topical creams and helped. She will just have bleeding. No infection or pain. No purulent drainage. No dyschezia. Reviewed evaluation and mgt with pt at [facility]. She has had \"circulation issues\" with her feet bilat. Feet feel cold. She has had concerns about this. She is having coldness in both of her feet. She will feel warm and yet have very cold feet. She has no FH of Raynaud's or vascular problems. Findings and mgt reviewed. Plan Arterial Doppler imaging then vascular eval reviewed. Nursing screenings reviewed by provider at visit. Past Medical History:   Diagnosis Date    Headache      Past Surgical History:   Procedure Laterality Date    HX GYN  08/2016    Akhil Guajardo        Prior to Admission medications    Medication Sig Start Date End Date Taking? Authorizing Provider   Rj Barrera 200-25 mcg/dose inhaler  11/16/21  Yes Provider, Historical   montelukast (SINGULAIR) 10 mg tablet  11/16/21  Yes Provider, Historical   venlafaxine-SR (EFFEXOR-XR) 150 mg capsule Take 1 Capsule by mouth daily. Take instead of 2 x 75mg capsules. 11/22/21  Yes Antwon Gotti MD   buPROPion XL (WELLBUTRIN XL) 150 mg tablet Take 1 Tablet by mouth daily. Take daily with Effexor (venlafaxine).  11/22/21  Yes Antwon Gotti MD   albuterol (PROVENTIL HFA, VENTOLIN HFA, PROAIR HFA) 90 mcg/actuation inhaler Take 2 Puffs by inhalation every four (4) hours as needed for Wheezing. 6/7/21  Yes Jeromy Carolina NP        ROS    Vitals:    12/13/21 1526 12/13/21 1528   BP: (!) 147/81 116/75   Pulse: 89    Resp: 16    Temp: 98 °F (36.7 °C)    TempSrc: Oral    SpO2: 99%    Weight: 175 lb (79.4 kg)    Height: 5' 3\" (1.6 m)       Body mass index is 31 kg/m². Physical Exam:     Physical Exam  Vitals and nursing note reviewed. Constitutional:       General: She is not in acute distress. Appearance: Normal appearance. She is well-developed. She is not diaphoretic. HENT:      Head: Normocephalic and atraumatic. Eyes:      General: No scleral icterus. Right eye: No discharge. Left eye: No discharge. Conjunctiva/sclera: Conjunctivae normal.   Cardiovascular:      Rate and Rhythm: Normal rate and regular rhythm. Pulses: Normal pulses. Heart sounds: Normal heart sounds. No murmur heard. No friction rub. No gallop. Pulmonary:      Effort: Pulmonary effort is normal. No respiratory distress. Breath sounds: Normal breath sounds. No stridor. No wheezing, rhonchi or rales. Abdominal:      General: Bowel sounds are normal. There is no distension. Palpations: Abdomen is soft. Tenderness: There is no abdominal tenderness. There is no guarding. Musculoskeletal:         General: No swelling, tenderness, deformity or signs of injury. Right lower leg: No edema. Left lower leg: No edema. Skin:     General: Skin is warm. Coloration: Skin is not jaundiced or pale. Findings: No bruising, erythema or rash. Neurological:      General: No focal deficit present. Mental Status: She is alert. Motor: No abnormal muscle tone. Coordination: Coordination normal.      Gait: Gait normal.      Comments: DP, PT, 2/2 bilat. Feet slightly cool bilat, despite socks. No LE or foot swelling bilat. Psychiatric:         Mood and Affect: Mood normal.         Behavior: Behavior normal.         Thought Content:  Thought content normal.         Judgment: Judgment normal.         An electronic signature was used to authenticate this note.   -- Niru Richards MD

## 2021-12-14 LAB
25(OH)D3+25(OH)D2 SERPL-MCNC: 30.1 NG/ML (ref 30–100)
ALBUMIN SERPL-MCNC: 4.8 G/DL (ref 3.9–5)
ALBUMIN/GLOB SERPL: 1.7 {RATIO} (ref 1.2–2.2)
ALP SERPL-CCNC: 147 IU/L (ref 44–121)
ALT SERPL-CCNC: 32 IU/L (ref 0–32)
AST SERPL-CCNC: 22 IU/L (ref 0–40)
BASOPHILS # BLD AUTO: 0.1 X10E3/UL (ref 0–0.2)
BASOPHILS NFR BLD AUTO: 1 %
BILIRUB SERPL-MCNC: 0.3 MG/DL (ref 0–1.2)
BUN SERPL-MCNC: 12 MG/DL (ref 6–20)
BUN/CREAT SERPL: 15 (ref 9–23)
CALCIUM SERPL-MCNC: 9.7 MG/DL (ref 8.7–10.2)
CHLORIDE SERPL-SCNC: 102 MMOL/L (ref 96–106)
CO2 SERPL-SCNC: 27 MMOL/L (ref 20–29)
CREAT SERPL-MCNC: 0.79 MG/DL (ref 0.57–1)
CRP SERPL-MCNC: 13 MG/L (ref 0–10)
EOSINOPHIL # BLD AUTO: 2.2 X10E3/UL (ref 0–0.4)
EOSINOPHIL NFR BLD AUTO: 17 %
ERYTHROCYTE [DISTWIDTH] IN BLOOD BY AUTOMATED COUNT: 13.1 % (ref 11.7–15.4)
ERYTHROCYTE [SEDIMENTATION RATE] IN BLOOD BY WESTERGREN METHOD: 24 MM/HR (ref 0–32)
EST. AVERAGE GLUCOSE BLD GHB EST-MCNC: 111 MG/DL
FERRITIN SERPL-MCNC: 87 NG/ML (ref 15–150)
FOLATE SERPL-MCNC: 11.7 NG/ML
GLOBULIN SER CALC-MCNC: 2.8 G/DL (ref 1.5–4.5)
GLUCOSE SERPL-MCNC: 88 MG/DL (ref 65–99)
HBA1C MFR BLD: 5.5 % (ref 4.8–5.6)
HCT VFR BLD AUTO: 40.8 % (ref 34–46.6)
HGB BLD-MCNC: 13.6 G/DL (ref 11.1–15.9)
IMM GRANULOCYTES # BLD AUTO: 0.2 X10E3/UL (ref 0–0.1)
IMM GRANULOCYTES NFR BLD AUTO: 2 %
IRON SATN MFR SERPL: 14 % (ref 15–55)
IRON SERPL-MCNC: 58 UG/DL (ref 27–159)
LYMPHOCYTES # BLD AUTO: 1.8 X10E3/UL (ref 0.7–3.1)
LYMPHOCYTES NFR BLD AUTO: 15 %
MCH RBC QN AUTO: 27.7 PG (ref 26.6–33)
MCHC RBC AUTO-ENTMCNC: 33.3 G/DL (ref 31.5–35.7)
MCV RBC AUTO: 83 FL (ref 79–97)
MONOCYTES # BLD AUTO: 0.7 X10E3/UL (ref 0.1–0.9)
MONOCYTES NFR BLD AUTO: 5 %
NEUTROPHILS # BLD AUTO: 7.5 X10E3/UL (ref 1.4–7)
NEUTROPHILS NFR BLD AUTO: 60 %
PLATELET # BLD AUTO: 279 X10E3/UL (ref 150–450)
POTASSIUM SERPL-SCNC: 4.3 MMOL/L (ref 3.5–5.2)
PROT SERPL-MCNC: 7.6 G/DL (ref 6–8.5)
RBC # BLD AUTO: 4.91 X10E6/UL (ref 3.77–5.28)
SODIUM SERPL-SCNC: 142 MMOL/L (ref 134–144)
T4 FREE SERPL-MCNC: 1.33 NG/DL (ref 0.82–1.77)
TIBC SERPL-MCNC: 401 UG/DL (ref 250–450)
TSH SERPL DL<=0.005 MIU/L-ACNC: 1.49 UIU/ML (ref 0.45–4.5)
UIBC SERPL-MCNC: 343 UG/DL (ref 131–425)
VIT B12 SERPL-MCNC: 634 PG/ML (ref 232–1245)
WBC # BLD AUTO: 12.6 X10E3/UL (ref 3.4–10.8)

## 2022-03-19 PROBLEM — J96.01 ACUTE RESPIRATORY FAILURE WITH HYPOXIA (HCC): Status: ACTIVE | Noted: 2021-09-07

## 2022-04-27 DIAGNOSIS — F32.A ANXIETY AND DEPRESSION: ICD-10-CM

## 2022-04-27 DIAGNOSIS — F41.9 ANXIETY AND DEPRESSION: ICD-10-CM

## 2022-04-28 DIAGNOSIS — F41.9 ANXIETY AND DEPRESSION: ICD-10-CM

## 2022-04-28 DIAGNOSIS — F32.A ANXIETY AND DEPRESSION: ICD-10-CM

## 2022-04-28 NOTE — TELEPHONE ENCOUNTER
Future Appointments:  No future appointments. Last Appointment With Me:  12/13/2021     Requested Prescriptions     Pending Prescriptions Disp Refills    venlafaxine-SR (EFFEXOR-XR) 150 mg capsule 30 Capsule 5     Sig: Take 1 Capsule by mouth daily. Take instead of 2 x 75mg capsules.  buPROPion XL (WELLBUTRIN XL) 150 mg tablet 90 Tablet 1     Sig: Take 1 Tablet by mouth daily. Take daily with Effexor (venlafaxine).

## 2022-04-30 RX ORDER — BUPROPION HYDROCHLORIDE 150 MG/1
150 TABLET ORAL DAILY
Qty: 90 TABLET | Refills: 1 | Status: SHIPPED | OUTPATIENT
Start: 2022-04-30

## 2022-04-30 RX ORDER — VENLAFAXINE HYDROCHLORIDE 150 MG/1
150 CAPSULE, EXTENDED RELEASE ORAL DAILY
Qty: 90 CAPSULE | Refills: 1 | Status: SHIPPED | OUTPATIENT
Start: 2022-04-30 | End: 2022-08-31

## 2022-05-03 RX ORDER — VENLAFAXINE HYDROCHLORIDE 150 MG/1
CAPSULE, EXTENDED RELEASE ORAL
Qty: 90 CAPSULE | Refills: 1 | OUTPATIENT
Start: 2022-05-03

## 2022-08-17 DIAGNOSIS — F32.A ANXIETY AND DEPRESSION: ICD-10-CM

## 2022-08-17 DIAGNOSIS — F41.9 ANXIETY AND DEPRESSION: ICD-10-CM

## 2022-08-23 DIAGNOSIS — F32.A ANXIETY AND DEPRESSION: ICD-10-CM

## 2022-08-23 DIAGNOSIS — F41.9 ANXIETY AND DEPRESSION: ICD-10-CM

## 2022-08-23 RX ORDER — BUPROPION HYDROCHLORIDE 150 MG/1
150 TABLET ORAL DAILY
Qty: 90 TABLET | Refills: 1 | Status: CANCELLED | OUTPATIENT
Start: 2022-08-23

## 2022-08-23 NOTE — TELEPHONE ENCOUNTER
Duplicate request: Wellbutrin- mg #90 with 1 refill was sent to [facility] on 04/30/2022. For Pharmacy Admin Tracking Only  Intervention Detail: Discontinued Rx: 1, reason: Duplicate Therapy  Time Spent (min): 5      Requested Prescriptions     Pending Prescriptions Disp Refills    buPROPion XL (WELLBUTRIN XL) 150 mg tablet 90 Tablet 1     Sig: Take 1 Tablet by mouth daily. Take daily with Effexor (venlafaxine).

## 2022-08-31 RX ORDER — VENLAFAXINE HYDROCHLORIDE 150 MG/1
CAPSULE, EXTENDED RELEASE ORAL
Qty: 90 CAPSULE | Refills: 1 | Status: SHIPPED | OUTPATIENT
Start: 2022-08-31

## 2022-08-31 NOTE — TELEPHONE ENCOUNTER
Refill request(s) approved--venlafaxine. Refill protocol details (computer-generated) reviewed, as available.

## 2023-05-04 DIAGNOSIS — F32.A ANXIETY AND DEPRESSION: ICD-10-CM

## 2023-05-04 DIAGNOSIS — F41.9 ANXIETY AND DEPRESSION: ICD-10-CM

## 2023-05-09 ENCOUNTER — TELEPHONE (OUTPATIENT)
Age: 28
End: 2023-05-09

## 2023-05-10 RX ORDER — VENLAFAXINE HYDROCHLORIDE 150 MG/1
150 CAPSULE, EXTENDED RELEASE ORAL DAILY
Qty: 90 CAPSULE | Refills: 0 | Status: SHIPPED | OUTPATIENT
Start: 2023-05-10

## 2023-05-10 RX ORDER — VENLAFAXINE HYDROCHLORIDE 150 MG/1
CAPSULE, EXTENDED RELEASE ORAL
Qty: 90 CAPSULE | Refills: 1 | OUTPATIENT
Start: 2023-05-10

## 2023-05-11 RX ORDER — VENLAFAXINE HYDROCHLORIDE 150 MG/1
150 CAPSULE, EXTENDED RELEASE ORAL DAILY
Qty: 90 CAPSULE | Refills: 0 | OUTPATIENT
Start: 2023-05-11

## 2023-05-11 NOTE — TELEPHONE ENCOUNTER
Refill request(s) approved--venlafaxine--90-day supply with 0 refill(s). MyChart message to pt--to schedule follow-up appt.

## 2023-05-11 NOTE — TELEPHONE ENCOUNTER
Future Appointments:  No future appointments. Last Appointment With Me:  12/13/2021     Requested Prescriptions     Pending Prescriptions Disp Refills    venlafaxine (EFFEXOR XR) 150 MG extended release capsule 90 capsule 0     Sig: Take 1 capsule by mouth daily Pt needs follow-up appt for further refills. PT NEEDS AN APPT.